# Patient Record
Sex: FEMALE | Race: WHITE | NOT HISPANIC OR LATINO | Employment: OTHER | RURAL
[De-identification: names, ages, dates, MRNs, and addresses within clinical notes are randomized per-mention and may not be internally consistent; named-entity substitution may affect disease eponyms.]

---

## 2020-04-16 ENCOUNTER — HISTORICAL (OUTPATIENT)
Dept: ADMINISTRATIVE | Facility: HOSPITAL | Age: 55
End: 2020-04-16

## 2020-04-16 LAB
ALBUMIN SERPL BCP-MCNC: 3 G/DL (ref 3.5–5)
ALBUMIN/GLOB SERPL: 0.8 {RATIO}
ALP SERPL-CCNC: 38 U/L (ref 41–108)
ALT SERPL W P-5'-P-CCNC: 24 U/L (ref 13–56)
AMPHET UR QL SCN: NEGATIVE NG/ML
APAP SERPL-MCNC: <2 UG/ML (ref 10–30)
AST SERPL W P-5'-P-CCNC: 24 U/L (ref 15–37)
BARBITURATES UR QL SCN: NEGATIVE NG/ML
BASOPHILS # BLD AUTO: 0.05 X10E3/UL (ref 0–0.2)
BASOPHILS NFR BLD AUTO: 0.3 % (ref 0–1)
BENZODIAZ METAB UR QL SCN: NEGATIVE NG/ML
BILIRUB SERPL-MCNC: 0.6 MG/DL (ref 0–1.2)
BILIRUB UR QL STRIP: NEGATIVE MG/DL
BUN SERPL-MCNC: 23 MG/DL (ref 7–18)
BUN/CREAT SERPL: 15.5
CALCIUM SERPL-MCNC: 8.9 MG/DL (ref 8.5–10.1)
CANNABINOIDS UR QL SCN: NEGATIVE NG/ML
CHLORIDE SERPL-SCNC: 99 MMOL/L (ref 98–107)
CLARITY UR: CLEAR
CO2 SERPL-SCNC: 28 MMOL/L (ref 21–32)
COCAINE UR QL SCN: NEGATIVE NG/ML
COLOR UR: ABNORMAL
CREAT SERPL-MCNC: 1.48 MG/DL (ref 0.55–1.02)
EOSINOPHIL # BLD AUTO: 0.06 X10E3/UL (ref 0–0.5)
EOSINOPHIL NFR BLD AUTO: 0.3 % (ref 1–4)
ERYTHROCYTE [DISTWIDTH] IN BLOOD BY AUTOMATED COUNT: 15.8 % (ref 11.5–14.5)
ETHANOL SERPL-MCNC: NORMAL MG/DL (ref 0–10)
GLOBULIN SER-MCNC: 3.8 G/DL (ref 2–4)
GLUCOSE SERPL-MCNC: 93 MG/DL (ref 74–106)
GLUCOSE UR STRIP-MCNC: NEGATIVE MG/DL
HCT VFR BLD AUTO: 34.3 % (ref 38–47)
HGB BLD-MCNC: 11 G/DL (ref 12–16)
IMM GRANULOCYTES # BLD AUTO: 0.21 X10E3/UL (ref 0–0.04)
IMM GRANULOCYTES NFR BLD: 1.1 % (ref 0–0.4)
KETONES UR STRIP-SCNC: NEGATIVE MG/DL
LACTATE SERPL-SCNC: 2.6 MMOL/L (ref 0.4–2)
LEUKOCYTE ESTERASE UR QL STRIP: NEGATIVE LEU/UL
LYMPHOCYTES # BLD AUTO: 2.51 X10E3/UL (ref 1–4.8)
LYMPHOCYTES NFR BLD AUTO: 13 % (ref 27–41)
MCH RBC QN AUTO: 28.6 PG (ref 27–31)
MCHC RBC AUTO-ENTMCNC: 32.1 G/DL (ref 32–36)
MCV RBC AUTO: 89.1 FL (ref 80–96)
MONOCYTES # BLD AUTO: 1.26 X10E3/UL (ref 0–0.8)
MONOCYTES NFR BLD AUTO: 6.5 % (ref 2–6)
MPC BLD CALC-MCNC: 8.7 FL (ref 9.4–12.4)
NEUTROPHILS # BLD AUTO: 15.28 X10E3/UL (ref 1.8–7.7)
NEUTROPHILS NFR BLD AUTO: 78.8 % (ref 53–65)
NITRITE UR QL STRIP: NEGATIVE
NRBC # BLD AUTO: 0 X10E3/UL (ref 0–0)
NRBC, AUTO (.00): 0 /100 (ref 0–0)
OPIATES UR QL SCN: POSITIVE NG/ML
PCP UR QL SCN: NEGATIVE NG/ML
PH UR STRIP: 6.5 PH UNITS (ref 5–8)
PLATELET # BLD AUTO: 341 X10E3/UL (ref 150–400)
POTASSIUM SERPL-SCNC: 3.1 MMOL/L (ref 3.5–5.1)
PROT SERPL-MCNC: 6.8 G/DL (ref 6.4–8.2)
PROT UR QL STRIP: NEGATIVE MG/DL
RBC # BLD AUTO: 3.85 X10E6/UL (ref 4.2–5.4)
RBC # UR STRIP: ABNORMAL ERY/UL
SALICYLATES SERPL-MCNC: 2.3 MG/DL (ref 3–30)
SODIUM SERPL-SCNC: 137 MMOL/L (ref 136–145)
SP GR UR STRIP: 1.01 (ref 1–1.03)
UROBILINOGEN UR STRIP-ACNC: 0.2 EU/DL
WBC # BLD AUTO: 19.37 X10E3/UL (ref 4.5–11)

## 2020-04-17 ENCOUNTER — HISTORICAL (OUTPATIENT)
Dept: ADMINISTRATIVE | Facility: HOSPITAL | Age: 55
End: 2020-04-17

## 2020-04-17 LAB
BASOPHILS # BLD AUTO: 0.03 X10E3/UL (ref 0–0.2)
BASOPHILS NFR BLD AUTO: 0.2 % (ref 0–1)
BUN SERPL-MCNC: 12 MG/DL (ref 7–18)
CALCIUM SERPL-MCNC: 9 MG/DL (ref 8.5–10.1)
CHLORIDE SERPL-SCNC: 102 MMOL/L (ref 98–107)
CO2 SERPL-SCNC: 27 MMOL/L (ref 21–32)
CREAT SERPL-MCNC: 0.83 MG/DL (ref 0.55–1.02)
EOSINOPHIL # BLD AUTO: 0.02 X10E3/UL (ref 0–0.5)
EOSINOPHIL NFR BLD AUTO: 0.1 % (ref 1–4)
ERYTHROCYTE [DISTWIDTH] IN BLOOD BY AUTOMATED COUNT: 15.4 % (ref 11.5–14.5)
GLUCOSE SERPL-MCNC: 104 MG/DL (ref 74–106)
HCT VFR BLD AUTO: 36 % (ref 38–47)
HGB BLD-MCNC: 11.7 G/DL (ref 12–16)
IMM GRANULOCYTES # BLD AUTO: 0.18 X10E3/UL (ref 0–0.04)
IMM GRANULOCYTES NFR BLD: 1 % (ref 0–0.4)
LYMPHOCYTES # BLD AUTO: 1.37 X10E3/UL (ref 1–4.8)
LYMPHOCYTES NFR BLD AUTO: 7.7 % (ref 27–41)
MCH RBC QN AUTO: 28.9 PG (ref 27–31)
MCHC RBC AUTO-ENTMCNC: 32.5 G/DL (ref 32–36)
MCV RBC AUTO: 88.9 FL (ref 80–96)
MONOCYTES # BLD AUTO: 0.59 X10E3/UL (ref 0–0.8)
MONOCYTES NFR BLD AUTO: 3.3 % (ref 2–6)
MPC BLD CALC-MCNC: 8.6 FL (ref 9.4–12.4)
NEUTROPHILS # BLD AUTO: 15.64 X10E3/UL (ref 1.8–7.7)
NEUTROPHILS NFR BLD AUTO: 87.7 % (ref 53–65)
NRBC # BLD AUTO: 0 X10E3/UL (ref 0–0)
NRBC, AUTO (.00): 0 /100 (ref 0–0)
PLATELET # BLD AUTO: 316 X10E3/UL (ref 150–400)
POTASSIUM SERPL-SCNC: 3.9 MMOL/L (ref 3.5–5.1)
RBC # BLD AUTO: 4.05 X10E6/UL (ref 4.2–5.4)
SODIUM SERPL-SCNC: 137 MMOL/L (ref 136–145)
WBC # BLD AUTO: 17.83 X10E3/UL (ref 4.5–11)

## 2020-04-18 ENCOUNTER — HISTORICAL (OUTPATIENT)
Dept: ADMINISTRATIVE | Facility: HOSPITAL | Age: 55
End: 2020-04-18

## 2020-04-18 LAB
BASOPHILS # BLD AUTO: 0.05 X10E3/UL (ref 0–0.2)
BASOPHILS NFR BLD AUTO: 0.3 % (ref 0–1)
BUN SERPL-MCNC: 6 MG/DL (ref 7–18)
CALCIUM SERPL-MCNC: 9.1 MG/DL (ref 8.5–10.1)
CHLORIDE SERPL-SCNC: 97 MMOL/L (ref 98–107)
CO2 SERPL-SCNC: 27 MMOL/L (ref 21–32)
CREAT SERPL-MCNC: 0.82 MG/DL (ref 0.55–1.02)
EOSINOPHIL # BLD AUTO: 0.13 X10E3/UL (ref 0–0.5)
EOSINOPHIL NFR BLD AUTO: 0.9 % (ref 1–4)
ERYTHROCYTE [DISTWIDTH] IN BLOOD BY AUTOMATED COUNT: 15.4 % (ref 11.5–14.5)
GLUCOSE SERPL-MCNC: 67 MG/DL (ref 74–106)
HCT VFR BLD AUTO: 33.4 % (ref 38–47)
HGB BLD-MCNC: 10.7 G/DL (ref 12–16)
IMM GRANULOCYTES # BLD AUTO: 0.11 X10E3/UL (ref 0–0.04)
IMM GRANULOCYTES NFR BLD: 0.7 % (ref 0–0.4)
LYMPHOCYTES # BLD AUTO: 2.38 X10E3/UL (ref 1–4.8)
LYMPHOCYTES NFR BLD AUTO: 15.7 % (ref 27–41)
MCH RBC QN AUTO: 28.6 PG (ref 27–31)
MCHC RBC AUTO-ENTMCNC: 32 G/DL (ref 32–36)
MCV RBC AUTO: 89.3 FL (ref 80–96)
MONOCYTES # BLD AUTO: 0.66 X10E3/UL (ref 0–0.8)
MONOCYTES NFR BLD AUTO: 4.4 % (ref 2–6)
MPC BLD CALC-MCNC: 9 FL (ref 9.4–12.4)
NEUTROPHILS # BLD AUTO: 11.8 X10E3/UL (ref 1.8–7.7)
NEUTROPHILS NFR BLD AUTO: 78 % (ref 53–65)
NRBC # BLD AUTO: 0 X10E3/UL (ref 0–0)
NRBC, AUTO (.00): 0 /100 (ref 0–0)
PLATELET # BLD AUTO: 337 X10E3/UL (ref 150–400)
POTASSIUM SERPL-SCNC: 3.1 MMOL/L (ref 3.5–5.1)
RBC # BLD AUTO: 3.74 X10E6/UL (ref 4.2–5.4)
SODIUM SERPL-SCNC: 135 MMOL/L (ref 136–145)
WBC # BLD AUTO: 15.13 X10E3/UL (ref 4.5–11)

## 2020-04-22 LAB
REPORT: NORMAL

## 2020-07-06 ENCOUNTER — HISTORICAL (OUTPATIENT)
Dept: ADMINISTRATIVE | Facility: HOSPITAL | Age: 55
End: 2020-07-06

## 2020-07-30 ENCOUNTER — HISTORICAL (OUTPATIENT)
Dept: ADMINISTRATIVE | Facility: HOSPITAL | Age: 55
End: 2020-07-30

## 2020-09-16 ENCOUNTER — HISTORICAL (OUTPATIENT)
Dept: ADMINISTRATIVE | Facility: HOSPITAL | Age: 55
End: 2020-09-16

## 2020-12-01 ENCOUNTER — HISTORICAL (OUTPATIENT)
Dept: ADMINISTRATIVE | Facility: HOSPITAL | Age: 55
End: 2020-12-01

## 2020-12-16 ENCOUNTER — HISTORICAL (OUTPATIENT)
Dept: ADMINISTRATIVE | Facility: HOSPITAL | Age: 55
End: 2020-12-16

## 2021-02-02 ENCOUNTER — HISTORICAL (OUTPATIENT)
Dept: ADMINISTRATIVE | Facility: HOSPITAL | Age: 56
End: 2021-02-02

## 2021-03-31 ENCOUNTER — TELEPHONE (OUTPATIENT)
Dept: FAMILY MEDICINE | Facility: CLINIC | Age: 56
End: 2021-03-31

## 2021-04-05 ENCOUNTER — HISTORICAL (OUTPATIENT)
Dept: ADMINISTRATIVE | Facility: HOSPITAL | Age: 56
End: 2021-04-05

## 2021-04-07 ENCOUNTER — HOSPITAL ENCOUNTER (OUTPATIENT)
Dept: RADIOLOGY | Facility: HOSPITAL | Age: 56
Discharge: HOME | End: 2021-04-07
Attending: NURSE PRACTITIONER
Payer: COMMERCIAL

## 2021-04-07 DIAGNOSIS — S72.002S HIP FRACTURE REQUIRING OPERATIVE REPAIR, LEFT, SEQUELA: ICD-10-CM

## 2021-04-07 PROCEDURE — 73552 XR FEMUR 2 VIEW LEFT: ICD-10-PCS | Mod: 26,LT,, | Performed by: RADIOLOGY

## 2021-04-07 PROCEDURE — 73552 X-RAY EXAM OF FEMUR 2/>: CPT | Mod: TC,LT

## 2021-04-07 PROCEDURE — 73552 X-RAY EXAM OF FEMUR 2/>: CPT | Mod: 26,LT,, | Performed by: RADIOLOGY

## 2021-04-29 ENCOUNTER — HOSPITAL ENCOUNTER (OUTPATIENT)
Dept: RADIOLOGY | Facility: HOSPITAL | Age: 56
Discharge: HOME | End: 2021-04-29
Attending: NURSE PRACTITIONER
Payer: COMMERCIAL

## 2021-04-29 DIAGNOSIS — Z47.89 ORTHOPEDIC AFTERCARE: ICD-10-CM

## 2021-04-29 PROBLEM — B58.01 TOXOPLASMA CHORIORETINITIS: Status: ACTIVE | Noted: 2019-08-27

## 2021-04-29 PROBLEM — Z86.19 HISTORY OF THRUSH: Status: ACTIVE | Noted: 2018-06-19

## 2021-04-29 PROBLEM — D80.1 HYPOGAMMAGLOBULINEMIA: Status: ACTIVE | Noted: 2018-06-19

## 2021-04-29 PROBLEM — H35.372 EPIRETINAL MEMBRANE, LEFT EYE: Status: ACTIVE | Noted: 2020-10-27

## 2021-04-29 PROBLEM — Z79.52 CURRENT CHRONIC USE OF SYSTEMIC STEROIDS: Status: ACTIVE | Noted: 2018-06-19

## 2021-04-29 PROBLEM — S72.90XA FEMUR FRACTURE: Status: ACTIVE | Noted: 2021-04-29

## 2021-04-29 PROBLEM — B99.9 RECURRENT INFECTIONS: Status: ACTIVE | Noted: 2018-06-19

## 2021-04-29 PROCEDURE — 73552 X-RAY EXAM OF FEMUR 2/>: CPT | Mod: 26,LT,,

## 2021-04-29 PROCEDURE — 73552 X-RAY EXAM OF FEMUR 2/>: CPT | Mod: TC,LT

## 2021-04-29 PROCEDURE — 73552 XR FEMUR 2 VIEW LEFT: ICD-10-PCS | Mod: 26,LT,,

## 2021-05-24 ENCOUNTER — HOSPITAL ENCOUNTER (OUTPATIENT)
Dept: RADIOLOGY | Facility: HOSPITAL | Age: 56
Discharge: HOME | End: 2021-05-24
Attending: ORTHOPAEDIC SURGERY
Payer: MEDICARE

## 2021-05-24 DIAGNOSIS — Z47.89 ORTHOPEDIC AFTERCARE: ICD-10-CM

## 2021-05-24 PROBLEM — M17.0 PRIMARY OSTEOARTHRITIS OF BOTH KNEES: Status: ACTIVE | Noted: 2021-05-24

## 2021-05-24 PROCEDURE — 73562 X-RAY EXAM OF KNEE 3: CPT | Mod: TC,50

## 2021-05-24 PROCEDURE — 73552 X-RAY EXAM OF FEMUR 2/>: CPT | Mod: TC,LT

## 2021-11-10 ENCOUNTER — OFFICE VISIT (OUTPATIENT)
Dept: FAMILY MEDICINE | Facility: CLINIC | Age: 56
End: 2021-11-10
Payer: COMMERCIAL

## 2021-11-10 VITALS
OXYGEN SATURATION: 97 % | TEMPERATURE: 99 F | BODY MASS INDEX: 43.61 KG/M2 | WEIGHT: 237 LBS | DIASTOLIC BLOOD PRESSURE: 81 MMHG | SYSTOLIC BLOOD PRESSURE: 122 MMHG | RESPIRATION RATE: 20 BRPM | HEART RATE: 127 BPM | HEIGHT: 62 IN

## 2021-11-10 DIAGNOSIS — Z79.899 LONG TERM CURRENT USE OF DIURETIC: ICD-10-CM

## 2021-11-10 DIAGNOSIS — E87.1 HYPONATREMIA: ICD-10-CM

## 2021-11-10 DIAGNOSIS — Z09 HOSPITAL DISCHARGE FOLLOW-UP: Primary | ICD-10-CM

## 2021-11-10 DIAGNOSIS — J18.9 PNEUMONIA OF BOTH LOWER LOBES DUE TO INFECTIOUS ORGANISM: ICD-10-CM

## 2021-11-10 PROCEDURE — 99213 PR OFFICE/OUTPT VISIT, EST, LEVL III, 20-29 MIN: ICD-10-PCS | Mod: ,,, | Performed by: NURSE PRACTITIONER

## 2021-11-10 PROCEDURE — 80053 COMPREHENSIVE METABOLIC PANEL: ICD-10-PCS | Mod: ,,, | Performed by: CLINICAL MEDICAL LABORATORY

## 2021-11-10 PROCEDURE — 80053 COMPREHEN METABOLIC PANEL: CPT | Mod: ,,, | Performed by: CLINICAL MEDICAL LABORATORY

## 2021-11-10 PROCEDURE — 83735 MAGNESIUM: ICD-10-PCS | Mod: ,,, | Performed by: CLINICAL MEDICAL LABORATORY

## 2021-11-10 PROCEDURE — 83735 ASSAY OF MAGNESIUM: CPT | Mod: ,,, | Performed by: CLINICAL MEDICAL LABORATORY

## 2021-11-10 PROCEDURE — 99213 OFFICE O/P EST LOW 20 MIN: CPT | Mod: ,,, | Performed by: NURSE PRACTITIONER

## 2021-11-10 RX ORDER — HYDROXYCHLOROQUINE SULFATE 200 MG/1
200 TABLET, FILM COATED ORAL DAILY
COMMUNITY
Start: 2021-10-21 | End: 2022-10-06

## 2021-11-10 RX ORDER — GABAPENTIN 300 MG/1
300 CAPSULE ORAL 3 TIMES DAILY
COMMUNITY
Start: 2021-10-21 | End: 2021-11-30 | Stop reason: SDUPTHER

## 2021-11-10 RX ORDER — FLUCONAZOLE 200 MG/1
200 TABLET ORAL DAILY
COMMUNITY
Start: 2021-10-21 | End: 2023-01-17 | Stop reason: HOSPADM

## 2021-11-10 RX ORDER — APIXABAN 5 MG/1
10 TABLET, FILM COATED ORAL 2 TIMES DAILY
COMMUNITY
Start: 2021-10-21 | End: 2022-07-22 | Stop reason: SDUPTHER

## 2021-11-10 RX ORDER — CYANOCOBALAMIN 1000 UG/ML
INJECTION, SOLUTION INTRAMUSCULAR; SUBCUTANEOUS
COMMUNITY
Start: 2021-10-21

## 2021-11-10 RX ORDER — PANTOPRAZOLE SODIUM 40 MG/1
40 TABLET, DELAYED RELEASE ORAL DAILY
COMMUNITY
Start: 2021-10-21

## 2021-11-10 RX ORDER — PREDNISONE 5 MG/1
15 TABLET ORAL DAILY
COMMUNITY
Start: 2021-10-21 | End: 2023-05-05

## 2021-11-10 RX ORDER — OXYCODONE AND ACETAMINOPHEN 10; 325 MG/1; MG/1
TABLET ORAL
COMMUNITY
Start: 2021-10-22

## 2021-11-10 RX ORDER — PREDNISONE 10 MG/1
TABLET ORAL
COMMUNITY
Start: 2021-10-21

## 2021-11-10 RX ORDER — BACLOFEN 10 MG/1
10 TABLET ORAL 3 TIMES DAILY PRN
COMMUNITY
Start: 2021-10-21 | End: 2022-07-05

## 2021-11-10 RX ORDER — METFORMIN HYDROCHLORIDE 500 MG/1
1000 TABLET, EXTENDED RELEASE ORAL DAILY
COMMUNITY
Start: 2021-10-21 | End: 2022-07-05

## 2021-11-10 RX ORDER — CYCLOSPORINE 0.5 MG/ML
1 EMULSION OPHTHALMIC
COMMUNITY
Start: 2020-11-12 | End: 2022-12-28

## 2021-11-10 RX ORDER — FOLIC ACID 1 MG/1
1000 TABLET ORAL DAILY
COMMUNITY
Start: 2021-10-21

## 2021-11-10 RX ORDER — HUMAN IMMUNOGLOBULIN G 0.2 G/ML
LIQUID SUBCUTANEOUS
COMMUNITY
Start: 2021-10-19

## 2021-11-10 RX ORDER — SULFAMETHOXAZOLE AND TRIMETHOPRIM 800; 160 MG/1; MG/1
1 TABLET ORAL DAILY
COMMUNITY
Start: 2021-10-21 | End: 2022-10-19

## 2021-11-10 RX ORDER — PROMETHAZINE HYDROCHLORIDE 25 MG/1
TABLET ORAL
COMMUNITY
Start: 2021-10-21

## 2021-11-10 RX ORDER — OXYCODONE HYDROCHLORIDE 20 MG/1
TABLET, FILM COATED, EXTENDED RELEASE ORAL
COMMUNITY
Start: 2021-10-29

## 2021-11-10 RX ORDER — FLUTICASONE PROPIONATE 50 MCG
2 SPRAY, SUSPENSION (ML) NASAL
COMMUNITY
End: 2022-07-05

## 2021-11-10 RX ORDER — THYROID, PORCINE 30 MG/1
30 TABLET ORAL DAILY
COMMUNITY
Start: 2021-10-21

## 2021-11-10 RX ORDER — TIZANIDINE 4 MG/1
4 TABLET ORAL 3 TIMES DAILY PRN
COMMUNITY
Start: 2021-05-20 | End: 2022-07-05

## 2021-11-10 RX ORDER — LISINOPRIL AND HYDROCHLOROTHIAZIDE 20; 25 MG/1; MG/1
1 TABLET ORAL DAILY
COMMUNITY
Start: 2021-10-21 | End: 2021-11-30 | Stop reason: HOSPADM

## 2021-11-12 LAB
ALBUMIN SERPL BCP-MCNC: 3.5 G/DL (ref 3.5–5)
ALBUMIN/GLOB SERPL: 0.8 {RATIO}
ALP SERPL-CCNC: 43 U/L (ref 46–118)
ALT SERPL W P-5'-P-CCNC: 27 U/L (ref 13–56)
ANION GAP SERPL CALCULATED.3IONS-SCNC: 13 MMOL/L (ref 7–16)
AST SERPL W P-5'-P-CCNC: 24 U/L (ref 15–37)
BILIRUB SERPL-MCNC: 0.1 MG/DL (ref 0–1.2)
BUN SERPL-MCNC: 13 MG/DL (ref 7–18)
BUN/CREAT SERPL: 13 (ref 6–20)
CALCIUM SERPL-MCNC: 9.5 MG/DL (ref 8.5–10.1)
CHLORIDE SERPL-SCNC: 97 MMOL/L (ref 98–107)
CO2 SERPL-SCNC: 28 MMOL/L (ref 21–32)
CREAT SERPL-MCNC: 0.97 MG/DL (ref 0.55–1.02)
GLOBULIN SER-MCNC: 4.3 G/DL (ref 2–4)
GLUCOSE SERPL-MCNC: 117 MG/DL (ref 74–106)
MAGNESIUM SERPL-MCNC: 2.2 MG/DL (ref 1.7–2.3)
POTASSIUM SERPL-SCNC: 7.5 MMOL/L (ref 3.5–5.1)
PROT SERPL-MCNC: 7.8 G/DL (ref 6.4–8.2)
SODIUM SERPL-SCNC: 130 MMOL/L (ref 136–145)

## 2021-11-15 ENCOUNTER — TELEPHONE (OUTPATIENT)
Dept: FAMILY MEDICINE | Facility: CLINIC | Age: 56
End: 2021-11-15
Payer: COMMERCIAL

## 2021-11-30 ENCOUNTER — OFFICE VISIT (OUTPATIENT)
Dept: FAMILY MEDICINE | Facility: CLINIC | Age: 56
End: 2021-11-30
Payer: COMMERCIAL

## 2021-11-30 VITALS
WEIGHT: 240.81 LBS | SYSTOLIC BLOOD PRESSURE: 122 MMHG | BODY MASS INDEX: 44.31 KG/M2 | HEART RATE: 112 BPM | TEMPERATURE: 98 F | OXYGEN SATURATION: 98 % | HEIGHT: 62 IN | DIASTOLIC BLOOD PRESSURE: 71 MMHG | RESPIRATION RATE: 18 BRPM

## 2021-11-30 DIAGNOSIS — B02.9 HERPES ZOSTER WITHOUT COMPLICATION: ICD-10-CM

## 2021-11-30 DIAGNOSIS — I10 PRIMARY HYPERTENSION: ICD-10-CM

## 2021-11-30 DIAGNOSIS — J18.9 PNEUMONIA DUE TO INFECTIOUS ORGANISM, UNSPECIFIED LATERALITY, UNSPECIFIED PART OF LUNG: ICD-10-CM

## 2021-11-30 DIAGNOSIS — E87.6 HYPOKALEMIA: ICD-10-CM

## 2021-11-30 DIAGNOSIS — Z09 HOSPITAL DISCHARGE FOLLOW-UP: Primary | ICD-10-CM

## 2021-11-30 DIAGNOSIS — E87.1 HYPONATREMIA: ICD-10-CM

## 2021-11-30 PROCEDURE — 99214 OFFICE O/P EST MOD 30 MIN: CPT | Mod: ,,, | Performed by: NURSE PRACTITIONER

## 2021-11-30 PROCEDURE — 99214 PR OFFICE/OUTPT VISIT, EST, LEVL IV, 30-39 MIN: ICD-10-PCS | Mod: ,,, | Performed by: NURSE PRACTITIONER

## 2021-11-30 RX ORDER — GABAPENTIN 300 MG/1
600 CAPSULE ORAL 3 TIMES DAILY
Qty: 84 CAPSULE | Refills: 0 | Status: SHIPPED | OUTPATIENT
Start: 2021-11-30 | End: 2022-07-05 | Stop reason: DRUGHIGH

## 2021-11-30 RX ORDER — VALACYCLOVIR HYDROCHLORIDE 500 MG/1
500 TABLET, FILM COATED ORAL DAILY
COMMUNITY

## 2021-11-30 RX ORDER — LISINOPRIL 20 MG/1
20 TABLET ORAL DAILY
Qty: 90 TABLET | Refills: 1 | Status: SHIPPED | OUTPATIENT
Start: 2021-11-30 | End: 2022-12-28

## 2022-04-20 ENCOUNTER — HOSPITAL ENCOUNTER (OUTPATIENT)
Dept: RADIOLOGY | Facility: HOSPITAL | Age: 57
Discharge: HOME | End: 2022-04-20
Attending: ORTHOPAEDIC SURGERY
Payer: COMMERCIAL

## 2022-04-20 DIAGNOSIS — M25.512 BILATERAL SHOULDER PAIN, UNSPECIFIED CHRONICITY: ICD-10-CM

## 2022-04-20 DIAGNOSIS — M25.511 BILATERAL SHOULDER PAIN, UNSPECIFIED CHRONICITY: ICD-10-CM

## 2022-04-20 PROCEDURE — 73030 X-RAY EXAM OF SHOULDER: CPT | Mod: TC,50

## 2022-05-09 PROBLEM — M12.811 ROTATOR CUFF ARTHROPATHY OF BOTH SHOULDERS: Status: ACTIVE | Noted: 2022-05-09

## 2022-05-09 PROBLEM — M12.812 ROTATOR CUFF ARTHROPATHY OF BOTH SHOULDERS: Status: ACTIVE | Noted: 2022-05-09

## 2022-06-09 ENCOUNTER — HOSPITAL ENCOUNTER (OUTPATIENT)
Dept: RADIOLOGY | Facility: HOSPITAL | Age: 57
Discharge: HOME | End: 2022-06-09
Payer: COMMERCIAL

## 2022-06-09 ENCOUNTER — HOSPITAL ENCOUNTER (OUTPATIENT)
Dept: RADIOLOGY | Facility: HOSPITAL | Age: 57
Discharge: HOME | End: 2022-06-09
Attending: NURSE PRACTITIONER
Payer: COMMERCIAL

## 2022-06-09 VITALS — WEIGHT: 240 LBS | HEIGHT: 62 IN | BODY MASS INDEX: 44.16 KG/M2

## 2022-06-09 DIAGNOSIS — Z12.31 VISIT FOR SCREENING MAMMOGRAM: ICD-10-CM

## 2022-06-09 DIAGNOSIS — Z47.89 ORTHOPEDIC AFTERCARE: ICD-10-CM

## 2022-06-09 PROBLEM — M12.819 ROTATOR CUFF ARTHROPATHY: Status: ACTIVE | Noted: 2022-06-09

## 2022-06-09 PROCEDURE — 77067 SCR MAMMO BI INCL CAD: CPT | Mod: TC

## 2022-06-09 PROCEDURE — 73552 XR FEMUR 2 VIEW LEFT: ICD-10-PCS | Mod: 26,LT,, | Performed by: RADIOLOGY

## 2022-06-09 PROCEDURE — 73552 X-RAY EXAM OF FEMUR 2/>: CPT | Mod: TC,LT

## 2022-06-09 PROCEDURE — 73552 X-RAY EXAM OF FEMUR 2/>: CPT | Mod: 26,LT,, | Performed by: RADIOLOGY

## 2022-07-05 ENCOUNTER — OFFICE VISIT (OUTPATIENT)
Dept: FAMILY MEDICINE | Facility: CLINIC | Age: 57
End: 2022-07-05
Payer: MEDICARE

## 2022-07-05 VITALS
RESPIRATION RATE: 20 BRPM | TEMPERATURE: 99 F | DIASTOLIC BLOOD PRESSURE: 104 MMHG | WEIGHT: 245 LBS | HEIGHT: 62 IN | HEART RATE: 95 BPM | SYSTOLIC BLOOD PRESSURE: 162 MMHG | OXYGEN SATURATION: 98 % | BODY MASS INDEX: 45.08 KG/M2

## 2022-07-05 DIAGNOSIS — D83.9 COMMON VARIABLE IMMUNODEFICIENCY: ICD-10-CM

## 2022-07-05 DIAGNOSIS — Z86.711 HISTORY OF PULMONARY EMBOLISM: ICD-10-CM

## 2022-07-05 DIAGNOSIS — Z79.01 ANTICOAGULANT LONG-TERM USE: ICD-10-CM

## 2022-07-05 DIAGNOSIS — I10 PRIMARY HYPERTENSION: ICD-10-CM

## 2022-07-05 DIAGNOSIS — I26.99 PULMONARY EMBOLISM, UNSPECIFIED CHRONICITY, UNSPECIFIED PULMONARY EMBOLISM TYPE, UNSPECIFIED WHETHER ACUTE COR PULMONALE PRESENT: ICD-10-CM

## 2022-07-05 DIAGNOSIS — E66.01 MORBID OBESITY: ICD-10-CM

## 2022-07-05 DIAGNOSIS — Z09 HOSPITAL DISCHARGE FOLLOW-UP: Primary | ICD-10-CM

## 2022-07-05 PROCEDURE — 99212 OFFICE O/P EST SF 10 MIN: CPT | Mod: ,,, | Performed by: NURSE PRACTITIONER

## 2022-07-05 PROCEDURE — 99212 PR OFFICE/OUTPT VISIT, EST, LEVL II, 10-19 MIN: ICD-10-PCS | Mod: ,,, | Performed by: NURSE PRACTITIONER

## 2022-07-05 RX ORDER — SEMAGLUTIDE 1.34 MG/ML
1 INJECTION, SOLUTION SUBCUTANEOUS
COMMUNITY
Start: 2022-06-08 | End: 2022-12-28 | Stop reason: DRUGHIGH

## 2022-07-05 RX ORDER — CYCLOBENZAPRINE HCL 10 MG
10 TABLET ORAL 2 TIMES DAILY PRN
COMMUNITY
Start: 2022-06-06

## 2022-07-05 RX ORDER — EZETIMIBE 10 MG/1
10 TABLET ORAL DAILY
COMMUNITY
Start: 2022-06-22

## 2022-07-05 RX ORDER — FENOFIBRATE 160 MG/1
160 TABLET ORAL DAILY
COMMUNITY
Start: 2022-06-22

## 2022-07-05 RX ORDER — GABAPENTIN 600 MG/1
600 TABLET ORAL 3 TIMES DAILY
COMMUNITY
Start: 2022-06-13

## 2022-07-05 NOTE — PROGRESS NOTES
Subjective:       Patient ID: Hawa Street is a 57 y.o. female.    Chief Complaint: Hospital Follow Up (CHANEL Agudelo)    Ms. Street presents to clinic in hospital follow up, she did not bring her medications and discharge medications were reconciled verbally with patient. Hospital records reviewed, she was no longer taking anticoagulant and developed pulmonary embolus per CT of chest - she confirms that she is currently taking Eliquis 10mg BID and will go back to 5mg BID as instructed. She reports dyspnea on exertion, discussed that this should improve slowly. She is morbidly obese with sedentary lifestyle and multiple co morbidities. She denies bleeding, multiple bruises noted to bilateral lower legs and bilateral forearms.    Review of Systems   Constitutional: Positive for fatigue. Negative for activity change, appetite change and unexpected weight change.   Respiratory: Positive for shortness of breath. Negative for cough and wheezing.    Cardiovascular: Negative for chest pain, palpitations and leg swelling.   Gastrointestinal: Negative for abdominal pain and blood in stool.   Genitourinary: Negative.    Musculoskeletal: Positive for back pain.   Neurological: Negative.    Psychiatric/Behavioral: Negative for dysphoric mood. The patient is not nervous/anxious.          Objective:      Physical Exam  Vitals and nursing note reviewed.   Constitutional:       Appearance: Normal appearance. She is obese.   HENT:      Head: Normocephalic.   Cardiovascular:      Rate and Rhythm: Normal rate and regular rhythm.      Pulses: Normal pulses.      Heart sounds: Normal heart sounds.   Pulmonary:      Effort: Pulmonary effort is normal.      Breath sounds: Normal breath sounds.   Abdominal:      General: Bowel sounds are normal.      Palpations: Abdomen is soft.   Musculoskeletal:         General: Normal range of motion.      Cervical back: Normal range of motion.   Skin:     General: Skin is warm and dry.    Neurological:      Mental Status: She is alert and oriented to person, place, and time.   Psychiatric:         Behavior: Behavior normal.         Assessment:       Problem List Items Addressed This Visit        Cardiac/Vascular    Hypertension       Immunology/Multi System    Common variable immunodeficiency       Hematology    History of pulmonary embolism      Other Visit Diagnoses     Hospital discharge follow-up    -  Primary    Pulmonary embolism, unspecified chronicity, unspecified pulmonary embolism type, unspecified whether acute cor pulmonale present        Anticoagulant long-term use        Morbid obesity              Plan:        Hospital records reviewed, continue eliquis as prescribed. Discussed with the patient that she should stay on anticoagulants indefinitely with recurrence of pulmonary embolus.   Encouraged increased activity as tolerated. Schedule follow up in 2 months.

## 2022-07-22 RX ORDER — APIXABAN 5 MG/1
10 TABLET, FILM COATED ORAL 2 TIMES DAILY
Qty: 90 TABLET | Refills: 1 | Status: SHIPPED | OUTPATIENT
Start: 2022-07-22 | End: 2022-10-06

## 2022-10-06 ENCOUNTER — OFFICE VISIT (OUTPATIENT)
Dept: FAMILY MEDICINE | Facility: CLINIC | Age: 57
End: 2022-10-06
Payer: COMMERCIAL

## 2022-10-06 VITALS
RESPIRATION RATE: 18 BRPM | BODY MASS INDEX: 46.01 KG/M2 | HEIGHT: 62 IN | TEMPERATURE: 98 F | OXYGEN SATURATION: 95 % | WEIGHT: 250 LBS | HEART RATE: 101 BPM

## 2022-10-06 DIAGNOSIS — Z86.711 HISTORY OF PULMONARY EMBOLISM: ICD-10-CM

## 2022-10-06 DIAGNOSIS — D80.1 HYPOGAMMAGLOBULINEMIA: ICD-10-CM

## 2022-10-06 DIAGNOSIS — J02.9 SORE THROAT: ICD-10-CM

## 2022-10-06 DIAGNOSIS — M12.811 ROTATOR CUFF ARTHROPATHY OF BOTH SHOULDERS: ICD-10-CM

## 2022-10-06 DIAGNOSIS — J40 BRONCHITIS: Primary | ICD-10-CM

## 2022-10-06 DIAGNOSIS — M35.00 SICCA SYNDROME: ICD-10-CM

## 2022-10-06 DIAGNOSIS — E66.01 MORBID OBESITY: ICD-10-CM

## 2022-10-06 DIAGNOSIS — Z79.01 ANTICOAGULANT LONG-TERM USE: ICD-10-CM

## 2022-10-06 DIAGNOSIS — E03.9 HYPOTHYROIDISM, UNSPECIFIED TYPE: ICD-10-CM

## 2022-10-06 DIAGNOSIS — M12.812 ROTATOR CUFF ARTHROPATHY OF BOTH SHOULDERS: ICD-10-CM

## 2022-10-06 DIAGNOSIS — I10 PRIMARY HYPERTENSION: ICD-10-CM

## 2022-10-06 DIAGNOSIS — B58.01 TOXOPLASMA CHORIORETINITIS: ICD-10-CM

## 2022-10-06 DIAGNOSIS — D83.9 COMMON VARIABLE IMMUNODEFICIENCY: ICD-10-CM

## 2022-10-06 DIAGNOSIS — M06.9 RHEUMATOID ARTHRITIS INVOLVING MULTIPLE SITES, UNSPECIFIED WHETHER RHEUMATOID FACTOR PRESENT: ICD-10-CM

## 2022-10-06 DIAGNOSIS — Z01.818 ENCOUNTER FOR PREOPERATIVE EXAMINATION FOR GENERAL SURGICAL PROCEDURE: ICD-10-CM

## 2022-10-06 PROCEDURE — 87804 PR  DETECT AGENT,IMMUN,DIR OBS,INFLUENZA: ICD-10-PCS | Mod: QW,59,91, | Performed by: NURSE PRACTITIONER

## 2022-10-06 PROCEDURE — 87426 SARSCOV CORONAVIRUS AG IA: CPT | Mod: QW,,, | Performed by: NURSE PRACTITIONER

## 2022-10-06 PROCEDURE — 99213 OFFICE O/P EST LOW 20 MIN: CPT | Mod: ,,, | Performed by: NURSE PRACTITIONER

## 2022-10-06 PROCEDURE — 99213 PR OFFICE/OUTPT VISIT, EST, LEVL III, 20-29 MIN: ICD-10-PCS | Mod: ,,, | Performed by: NURSE PRACTITIONER

## 2022-10-06 PROCEDURE — 87804 INFLUENZA ASSAY W/OPTIC: CPT | Mod: QW,,, | Performed by: NURSE PRACTITIONER

## 2022-10-06 PROCEDURE — 87426 SARSCOV CORONAVIRUS AG IA: CPT | Mod: RHCUB | Performed by: NURSE PRACTITIONER

## 2022-10-06 PROCEDURE — 87426 PR SARS-COV-2 COVID-19 IMMUNOASSAY QUAL/SEMIQUANT, MULT STEP METHOD: ICD-10-PCS | Mod: QW,,, | Performed by: NURSE PRACTITIONER

## 2022-10-06 PROCEDURE — 87804 INFLUENZA ASSAY W/OPTIC: CPT | Mod: RHCUB | Performed by: NURSE PRACTITIONER

## 2022-10-06 RX ORDER — ALBUTEROL SULFATE 90 UG/1
2 AEROSOL, METERED RESPIRATORY (INHALATION) EVERY 6 HOURS PRN
Qty: 18 G | Refills: 1 | Status: SHIPPED | OUTPATIENT
Start: 2022-10-06 | End: 2022-11-22

## 2022-10-06 RX ORDER — APIXABAN 5 MG/1
5 TABLET, FILM COATED ORAL 2 TIMES DAILY
Qty: 180 TABLET | Refills: 1 | Status: SHIPPED | OUTPATIENT
Start: 2022-10-06

## 2022-10-06 RX ORDER — ALBUTEROL SULFATE 0.63 MG/3ML
0.63 SOLUTION RESPIRATORY (INHALATION) EVERY 6 HOURS PRN
Qty: 100 ML | Refills: 0 | Status: SHIPPED | OUTPATIENT
Start: 2022-10-06 | End: 2023-10-06

## 2022-10-06 RX ORDER — AZITHROMYCIN 250 MG/1
TABLET, FILM COATED ORAL
Qty: 6 TABLET | Refills: 0 | Status: SHIPPED | OUTPATIENT
Start: 2022-10-06 | End: 2022-10-11

## 2022-10-06 NOTE — PROGRESS NOTES
Subjective:       Patient ID: Hawa Street is a 57 y.o. female.    Chief Complaint: Sore Throat (Started yesterday) and Cough (Dry and productive- yellow/green in color. Head congestion. )    Ms. Street presents to clinic with complaints of sore throat, chest congestion, productive cough, and wheezing. She reports symptom onset one day ago. She was originally scheduled for surgical clearance, but felt sick and this will need to be postponed. She is having shoulder surgery in [place] later this month. She continues to gain weight, she is sedentary and reports that since her recent PE she continues to have significant shortness of breath on exertion. She reports taking eliquis as prescribed.     She denies fever, chills, N/V/D. Flu and covid tests were negative today. The patient was seen in the parking lot.    Review of Systems   Constitutional:  Positive for activity change.   HENT:  Positive for sore throat. Negative for nasal congestion, postnasal drip and sinus pressure/congestion.    Respiratory:  Positive for cough, shortness of breath and wheezing.    Cardiovascular: Negative.    Gastrointestinal: Negative.        Objective:      Physical Exam  Vitals and nursing note reviewed.   Constitutional:       General: She is not in acute distress.     Appearance: Normal appearance. She is obese. She is not ill-appearing.   HENT:      Head: Normocephalic.      Right Ear: Tympanic membrane normal.      Left Ear: Tympanic membrane normal.      Nose: Nose normal.      Mouth/Throat:      Mouth: Mucous membranes are dry.      Pharynx: Posterior oropharyngeal erythema present.   Eyes:      Pupils: Pupils are equal, round, and reactive to light.   Cardiovascular:      Rate and Rhythm: Normal rate and regular rhythm.      Heart sounds: Normal heart sounds.   Pulmonary:      Effort: Pulmonary effort is normal. No respiratory distress.      Breath sounds: Wheezing present.   Musculoskeletal:      Cervical back: Normal  range of motion.   Lymphadenopathy:      Cervical: Cervical adenopathy present.   Neurological:      Mental Status: She is alert and oriented to person, place, and time.   Psychiatric:         Behavior: Behavior normal.       Assessment:       Problem List Items Addressed This Visit          Ophtho    Toxoplasma chorioretinitis       Cardiac/Vascular    Hypertension       Immunology/Multi System    Hypogammaglobulinemia    Common variable immunodeficiency    Rheumatoid arthritis    Sicca syndrome       Hematology    History of pulmonary embolism       Endocrine    Hypothyroidism       Orthopedic    Rotator cuff arthropathy of both shoulders     Other Visit Diagnoses       Bronchitis    -  Primary    Sore throat        Relevant Orders    POCT Influenza A/B    SARS Coronavirus 2 Antigen, POCT    Encounter for preoperative examination for general surgical procedure        Relevant Orders    Ambulatory referral/consult to Internal Medicine    Anticoagulant long-term use        Morbid obesity                  Plan:        Refer to IM for surgical clearance due to her multiple co morbidities.    Flu/Covid negative, take zpack as prescribed, proair inhaler vs. Nebs as this may work better if she is unable to take a deep breath due to dyspnea and her size.

## 2022-10-12 ENCOUNTER — TELEPHONE (OUTPATIENT)
Dept: FAMILY MEDICINE | Facility: CLINIC | Age: 57
End: 2022-10-12
Payer: COMMERCIAL

## 2022-10-12 LAB
CTP QC/QA: YES
CTP QC/QA: YES
FLUAV AG NPH QL: NEGATIVE
FLUBV AG NPH QL: NEGATIVE
SARS-COV-2 AG RESP QL IA.RAPID: NEGATIVE

## 2022-10-12 NOTE — TELEPHONE ENCOUNTER
----- Message from Ela Ignacio sent at 10/10/2022  2:17 PM CDT -----  Regarding: Needs a call back  Contact: Patient  Pt needs: a call back about recent visit, treatment. Coughing still. Had visit Thursday last week.    Pharmacy: [facility] - [place], [place] - [address] [place] [address]  Phone: [phone] Fax: [phone]        Phone #:  [phone]

## 2022-10-12 NOTE — TELEPHONE ENCOUNTER
----- Message from Ela Ignacio sent at 10/10/2022  2:17 PM CDT -----  Regarding: Needs a call back  Contact: Patient  Pt needs: a call back about recent visit, treatment. Coughing still. Had visit Thursday last week.    Pharmacy: [facility] - [place], [place] - [address] [place] [address]  Phone: [phone] Fax: [phone]        Phone #:  [phone]

## 2022-10-19 ENCOUNTER — OFFICE VISIT (OUTPATIENT)
Dept: INTERNAL MEDICINE | Facility: CLINIC | Age: 57
End: 2022-10-19
Payer: COMMERCIAL

## 2022-10-19 VITALS
SYSTOLIC BLOOD PRESSURE: 128 MMHG | HEIGHT: 62 IN | RESPIRATION RATE: 22 BRPM | HEART RATE: 106 BPM | OXYGEN SATURATION: 98 % | BODY MASS INDEX: 44.53 KG/M2 | WEIGHT: 242 LBS | DIASTOLIC BLOOD PRESSURE: 64 MMHG

## 2022-10-19 DIAGNOSIS — E55.9 VITAMIN D DEFICIENCY: ICD-10-CM

## 2022-10-19 DIAGNOSIS — Z86.711 HISTORY OF PULMONARY EMBOLISM: ICD-10-CM

## 2022-10-19 DIAGNOSIS — E66.01 CLASS 3 SEVERE OBESITY WITH BODY MASS INDEX (BMI) OF 40.0 TO 44.9 IN ADULT, UNSPECIFIED OBESITY TYPE, UNSPECIFIED WHETHER SERIOUS COMORBIDITY PRESENT: ICD-10-CM

## 2022-10-19 DIAGNOSIS — K21.9 GASTROESOPHAGEAL REFLUX DISEASE, UNSPECIFIED WHETHER ESOPHAGITIS PRESENT: ICD-10-CM

## 2022-10-19 DIAGNOSIS — Z01.818 ENCOUNTER FOR PREOPERATIVE EXAMINATION FOR GENERAL SURGICAL PROCEDURE: ICD-10-CM

## 2022-10-19 DIAGNOSIS — M12.819 ROTATOR CUFF ARTHROPATHY, UNSPECIFIED LATERALITY: ICD-10-CM

## 2022-10-19 DIAGNOSIS — R07.9 CHEST PAIN, UNSPECIFIED TYPE: ICD-10-CM

## 2022-10-19 DIAGNOSIS — Z79.52 CURRENT CHRONIC USE OF SYSTEMIC STEROIDS: ICD-10-CM

## 2022-10-19 DIAGNOSIS — D83.9 COMMON VARIABLE IMMUNODEFICIENCY: ICD-10-CM

## 2022-10-19 DIAGNOSIS — F41.9 ANXIETY: ICD-10-CM

## 2022-10-19 DIAGNOSIS — E78.5 DYSLIPIDEMIA: ICD-10-CM

## 2022-10-19 DIAGNOSIS — I10 PRIMARY HYPERTENSION: ICD-10-CM

## 2022-10-19 DIAGNOSIS — M06.9 RHEUMATOID ARTHRITIS INVOLVING MULTIPLE SITES, UNSPECIFIED WHETHER RHEUMATOID FACTOR PRESENT: ICD-10-CM

## 2022-10-19 DIAGNOSIS — E03.9 HYPOTHYROIDISM, UNSPECIFIED TYPE: Primary | ICD-10-CM

## 2022-10-19 DIAGNOSIS — M17.0 PRIMARY OSTEOARTHRITIS OF BOTH KNEES: ICD-10-CM

## 2022-10-19 DIAGNOSIS — R06.09 DYSPNEA ON EXERTION: ICD-10-CM

## 2022-10-19 DIAGNOSIS — D64.9 NORMOCYTIC ANEMIA: ICD-10-CM

## 2022-10-19 PROBLEM — E66.813 CLASS 3 SEVERE OBESITY WITH BODY MASS INDEX (BMI) OF 40.0 TO 44.9 IN ADULT: Status: ACTIVE | Noted: 2022-10-19

## 2022-10-19 PROCEDURE — 4010F PR ACE/ARB THEARPY RXD/TAKEN: ICD-10-PCS | Mod: ,,, | Performed by: INTERNAL MEDICINE

## 2022-10-19 PROCEDURE — 3074F PR MOST RECENT SYSTOLIC BLOOD PRESSURE < 130 MM HG: ICD-10-PCS | Mod: ,,, | Performed by: INTERNAL MEDICINE

## 2022-10-19 PROCEDURE — 3078F DIAST BP <80 MM HG: CPT | Mod: ,,, | Performed by: INTERNAL MEDICINE

## 2022-10-19 PROCEDURE — 4010F ACE/ARB THERAPY RXD/TAKEN: CPT | Mod: ,,, | Performed by: INTERNAL MEDICINE

## 2022-10-19 PROCEDURE — 3078F PR MOST RECENT DIASTOLIC BLOOD PRESSURE < 80 MM HG: ICD-10-PCS | Mod: ,,, | Performed by: INTERNAL MEDICINE

## 2022-10-19 PROCEDURE — 3074F SYST BP LT 130 MM HG: CPT | Mod: ,,, | Performed by: INTERNAL MEDICINE

## 2022-10-19 PROCEDURE — 99205 OFFICE O/P NEW HI 60 MIN: CPT | Mod: S$PBB,,, | Performed by: INTERNAL MEDICINE

## 2022-10-19 PROCEDURE — 99205 PR OFFICE/OUTPT VISIT, NEW, LEVL V, 60-74 MIN: ICD-10-PCS | Mod: S$PBB,,, | Performed by: INTERNAL MEDICINE

## 2022-10-19 PROCEDURE — 99215 OFFICE O/P EST HI 40 MIN: CPT | Mod: PBBFAC | Performed by: INTERNAL MEDICINE

## 2022-10-19 PROCEDURE — 1159F PR MEDICATION LIST DOCUMENTED IN MEDICAL RECORD: ICD-10-PCS | Mod: ,,, | Performed by: INTERNAL MEDICINE

## 2022-10-19 PROCEDURE — 1159F MED LIST DOCD IN RCRD: CPT | Mod: ,,, | Performed by: INTERNAL MEDICINE

## 2022-10-19 RX ORDER — CALCIUM CITRATE/VITAMIN D3 200MG-6.25
TABLET ORAL
COMMUNITY
Start: 2022-06-02

## 2022-10-19 RX ORDER — LISINOPRIL AND HYDROCHLOROTHIAZIDE 20; 25 MG/1; MG/1
TABLET ORAL
COMMUNITY
Start: 2022-10-12 | End: 2023-05-05 | Stop reason: HOSPADM

## 2022-10-19 NOTE — PROGRESS NOTES
"Subjective:       Patient ID: Hawa Street is a 57 y.o. female.    Chief Complaint: Pre-op Exam (Left shoulder surgery by Dr. Spring @ [facility] in [place]. Surgery scheduled 10/26/2022)    The patient is a 56 yo female that presents today for surgical pre-op examination. She has a complex past medical history. This includes hypothyroidism, PTE on two separate occasions, hypertension, dyslipidemia, DM II, Rheumatoid arthritis, Sjogrens, CVID, GERD, chronic steroid use. Plans are for left shoulder surgery next week. No known history of CAD, CVD, CHF, or valvular heart disease. She denies chest pain at rest. She does have some tightness and DESHPANDE with even minimal exertion. She states that she has never had a "cardiac" workup in the past. Father with history of CAD with bypass surgery. She has undergone multiple surgeries in the past without any complications or issues with anesthesia. She uses rollator or wheel chair for ambulation. Today she is resting comfortably in no distress. She is afebrile and vital signs are stable.    Review of Systems   Constitutional:  Positive for fatigue. Negative for appetite change, chills and fever.   HENT:  Positive for nasal congestion and sinus pressure/congestion. Negative for ear pain, hearing loss and sore throat.    Eyes:  Positive for eye dryness. Negative for pain, redness and visual disturbance.   Respiratory:  Positive for chest tightness and shortness of breath. Negative for apnea, cough and wheezing.    Cardiovascular:  Negative for chest pain and palpitations.   Gastrointestinal:  Negative for abdominal pain, constipation, diarrhea and nausea.   Endocrine: Negative for cold intolerance, heat intolerance and polyuria.   Genitourinary:  Negative for dysuria and hematuria.   Musculoskeletal:  Positive for arthralgias, back pain, neck pain and neck stiffness. Negative for joint swelling and myalgias.   Integumentary:  Negative for pallor, rash and wound. "   Allergic/Immunologic: Negative for immunocompromised state.   Neurological:  Negative for tremors, seizures, weakness, headaches and memory loss.   Hematological:  Negative for adenopathy.   Psychiatric/Behavioral:  Negative for confusion, dysphoric mood and sleep disturbance. The patient is not nervous/anxious.        Objective:      Physical Exam  Vitals and nursing note reviewed.   Constitutional:       General: She is not in acute distress.     Appearance: Normal appearance. She is obese. She is not ill-appearing.   HENT:      Head: Normocephalic and atraumatic.      Right Ear: External ear normal.      Left Ear: External ear normal.      Nose: Nose normal.      Mouth/Throat:      Pharynx: Oropharynx is clear.   Eyes:      Extraocular Movements: Extraocular movements intact.      Conjunctiva/sclera: Conjunctivae normal.      Pupils: Pupils are equal, round, and reactive to light.   Neck:      Vascular: No carotid bruit.   Cardiovascular:      Rate and Rhythm: Regular rhythm. Tachycardia present.      Pulses: Normal pulses.      Heart sounds: Normal heart sounds. No murmur heard.     Comments: Heart tones distant  Pulmonary:      Effort: No respiratory distress.      Breath sounds: Normal breath sounds. No wheezing or rales.   Abdominal:      General: Bowel sounds are normal.      Palpations: Abdomen is soft.   Musculoskeletal:         General: Normal range of motion.      Cervical back: Normal range of motion and neck supple.      Right lower leg: No edema.      Left lower leg: No edema.   Skin:     General: Skin is warm and dry.      Capillary Refill: Capillary refill takes less than 2 seconds.      Coloration: Skin is not pale.   Neurological:      General: No focal deficit present.      Mental Status: She is alert and oriented to person, place, and time.      Cranial Nerves: No cranial nerve deficit.      Gait: Gait abnormal.   Psychiatric:         Mood and Affect: Mood normal.         Judgment: Judgment  normal.       Assessment:       Problem List Items Addressed This Visit          Psychiatric    Anxiety       Cardiac/Vascular    Hypertension    Relevant Orders    Comprehensive Metabolic Panel    Dyslipidemia    Dyspnea on exertion    Relevant Orders    Echo       Immunology/Multi System    Common variable immunodeficiency    Rheumatoid arthritis       Hematology    History of pulmonary embolism       Oncology    Normocytic anemia    Relevant Orders    CBC Auto Differential       Endocrine    Current chronic use of systemic steroids    Vitamin D deficiency    Hypothyroidism - Primary       GI    Gastroesophageal reflux disease       Orthopedic    Primary osteoarthritis of both knees    Rotator cuff arthropathy     Other Visit Diagnoses       Encounter for preoperative examination for general surgical procedure        Chest pain, unspecified type        Relevant Orders    NM Myocardial Perfusion Spect Multi Pharmacologic    Nuclear Stress Test    Echo              Plan:     Shoulder osteoarthritis- patient presents today for surgical pre-op. No known history of CAD, CVD, CHF, or valvular heart disease. There is a family history of CAD in her father. She does have several risk factors that increase her risk for CAD. She is not able to perform at > 4 mets. She has significant DESHPANDE with even minimal exertion. This could be anginal equivalent. Due to this we are going to set her up for nuclear stress test and echo.    2.   History of PTE- on two separate occasions. She is on eliquis and this is recommended for life. Some of the DESHPANDE may be related to this. Her second PE developed when     she took herself off of the eliquis before a tooth extraction. She was off of it for a couple of months when she developed the clot. Therefore recommend minimal disruption in the eliquis.    3.   Dm II- She is on ozempic. I do not have any recent labs so we are going to order some to see how her creatinine and blood sugar is.    4.    Autoimmune disorders- RA, Sjogrens. She follows with rheumatology. She is chronically on prednisone. This could make healing difficult    5.   Essential hypertension- BP is good. 128/64. Continue home medications perioperatively    6.   Normocytic anemia- likely related to anemia of chronic disease. No recent CBC so I have ordered one    7.   Dyspnea on exertion- could be related to history of PTE, she does vape and this could contribute, also might represent anginal equivalent. We are going to check echo and nuclear stress test. Counseled on smoking cessation    8.   CVID- she follows with immunology. Not certain how diagnosis was made.

## 2022-10-26 ENCOUNTER — HOSPITAL ENCOUNTER (OUTPATIENT)
Dept: RADIOLOGY | Facility: HOSPITAL | Age: 57
Discharge: HOME | End: 2022-10-26
Attending: INTERNAL MEDICINE
Payer: COMMERCIAL

## 2022-10-26 ENCOUNTER — HOSPITAL ENCOUNTER (OUTPATIENT)
Dept: CARDIOLOGY | Facility: HOSPITAL | Age: 57
Discharge: HOME | End: 2022-10-26
Attending: INTERNAL MEDICINE
Payer: COMMERCIAL

## 2022-10-26 VITALS
HEIGHT: 68 IN | WEIGHT: 242 LBS | SYSTOLIC BLOOD PRESSURE: 114 MMHG | DIASTOLIC BLOOD PRESSURE: 84 MMHG | BODY MASS INDEX: 36.37 KG/M2 | HEART RATE: 88 BPM | BODY MASS INDEX: 44.53 KG/M2 | HEIGHT: 62 IN | WEIGHT: 240 LBS

## 2022-10-26 DIAGNOSIS — R07.9 CHEST PAIN, UNSPECIFIED TYPE: ICD-10-CM

## 2022-10-26 DIAGNOSIS — R06.09 DYSPNEA ON EXERTION: ICD-10-CM

## 2022-10-26 LAB
CV STRESS BASE HR: 88 BPM
DIASTOLIC BLOOD PRESSURE: 84 MMHG
OHS CV CPX 85 PERCENT MAX PREDICTED HEART RATE MALE: 133
OHS CV CPX MAX PREDICTED HEART RATE: 157
OHS CV CPX PATIENT IS FEMALE: 1
OHS CV CPX PATIENT IS MALE: 0
OHS CV CPX PEAK DIASTOLIC BLOOD PRESSURE: 98 MMHG
OHS CV CPX PEAK HEAR RATE: 110 BPM
OHS CV CPX PEAK RATE PRESSURE PRODUCT: NORMAL
OHS CV CPX PEAK SYSTOLIC BLOOD PRESSURE: 138 MMHG
OHS CV CPX PERCENT MAX PREDICTED HEART RATE ACHIEVED: 70
OHS CV CPX RATE PRESSURE PRODUCT PRESENTING: NORMAL
SYSTOLIC BLOOD PRESSURE: 114 MMHG

## 2022-10-26 PROCEDURE — A9500 TC99M SESTAMIBI: HCPCS

## 2022-10-26 PROCEDURE — 78452 HT MUSCLE IMAGE SPECT MULT: CPT | Mod: 26,,, | Performed by: STUDENT IN AN ORGANIZED HEALTH CARE EDUCATION/TRAINING PROGRAM

## 2022-10-26 PROCEDURE — 93306 TTE W/DOPPLER COMPLETE: CPT

## 2022-10-26 PROCEDURE — 93018 NUCLEAR STRESS TEST (CUPID ONLY): ICD-10-PCS | Mod: ,,, | Performed by: STUDENT IN AN ORGANIZED HEALTH CARE EDUCATION/TRAINING PROGRAM

## 2022-10-26 PROCEDURE — 63600175 PHARM REV CODE 636 W HCPCS: Performed by: INTERNAL MEDICINE

## 2022-10-26 PROCEDURE — 93017 CV STRESS TEST TRACING ONLY: CPT

## 2022-10-26 PROCEDURE — 93018 CV STRESS TEST I&R ONLY: CPT | Mod: ,,, | Performed by: STUDENT IN AN ORGANIZED HEALTH CARE EDUCATION/TRAINING PROGRAM

## 2022-10-26 PROCEDURE — 93016 NUCLEAR STRESS TEST (CUPID ONLY): ICD-10-PCS | Mod: ,,, | Performed by: NURSE PRACTITIONER

## 2022-10-26 PROCEDURE — 93016 CV STRESS TEST SUPVJ ONLY: CPT | Mod: ,,, | Performed by: NURSE PRACTITIONER

## 2022-10-26 PROCEDURE — 93306 TTE W/DOPPLER COMPLETE: CPT | Mod: 26,,, | Performed by: STUDENT IN AN ORGANIZED HEALTH CARE EDUCATION/TRAINING PROGRAM

## 2022-10-26 PROCEDURE — 93306 ECHO (CUPID ONLY): ICD-10-PCS | Mod: 26,,, | Performed by: STUDENT IN AN ORGANIZED HEALTH CARE EDUCATION/TRAINING PROGRAM

## 2022-10-26 PROCEDURE — 78452 NM MYOCARDIAL PERFUSION SPECT MULTI PHARM: ICD-10-PCS | Mod: 26,,, | Performed by: STUDENT IN AN ORGANIZED HEALTH CARE EDUCATION/TRAINING PROGRAM

## 2022-10-26 RX ORDER — REGADENOSON 0.08 MG/ML
0.4 INJECTION, SOLUTION INTRAVENOUS ONCE
Status: COMPLETED | OUTPATIENT
Start: 2022-10-26 | End: 2022-10-26

## 2022-10-26 RX ADMIN — REGADENOSON 0.4 MG: 0.08 INJECTION, SOLUTION INTRAVENOUS at 10:10

## 2022-10-28 LAB
AORTIC VALVE CUSP SEPERATION: 1.94 CM
AV INDEX (PROSTH): 0.76
AV MEAN GRADIENT: 5 MMHG
AV PEAK GRADIENT: 9 MMHG
AV VALVE AREA: 1.74 CM2
BSA FOR ECHO PROCEDURE: 2.19 M2
CV ECHO LV RWT: 0.63 CM
DOP CALC AO PEAK VEL: 1.5 M/S
DOP CALC AO VTI: 24.4 CM
DOP CALC LVOT AREA: 2.3 CM2
DOP CALC LVOT DIAMETER: 1.71 CM
DOP CALC LVOT STROKE VOLUME: 42.56 CM3
DOP CALCLVOT PEAK VEL VTI: 18.54 CM
E WAVE DECELERATION TIME: 246 MSEC
E/A RATIO: 0.92
E/E' RATIO: 9.2 M/S
ECHO LV POSTERIOR WALL: 1.15 CM (ref 0.6–1.1)
EJECTION FRACTION: 55 %
FRACTIONAL SHORTENING: 28 % (ref 28–44)
INTERVENTRICULAR SEPTUM: 1.11 CM (ref 0.6–1.1)
IVC DIAMETER: 1.5 CM
LEFT ATRIUM SIZE: 2.83 CM
LEFT INTERNAL DIMENSION IN SYSTOLE: 2.61 CM (ref 2.1–4)
LEFT VENTRICLE DIASTOLIC VOLUME INDEX: 27 ML/M2
LEFT VENTRICLE DIASTOLIC VOLUME: 55.89 ML
LEFT VENTRICLE MASS INDEX: 63 G/M2
LEFT VENTRICLE SYSTOLIC VOLUME INDEX: 12 ML/M2
LEFT VENTRICLE SYSTOLIC VOLUME: 24.84 ML
LEFT VENTRICULAR INTERNAL DIMENSION IN DIASTOLE: 3.64 CM (ref 3.5–6)
LEFT VENTRICULAR MASS: 131.35 G
LV LATERAL E/E' RATIO: 9.86 M/S
LV SEPTAL E/E' RATIO: 8.63 M/S
MV PEAK A VEL: 0.75 M/S
MV PEAK E VEL: 0.69 M/S
PISA TR MAX VEL: 1.71 M/S
RA PRESSURE: 3 MMHG
TDI LATERAL: 0.07 M/S
TDI SEPTAL: 0.08 M/S
TDI: 0.08 M/S
TR MAX PG: 12 MMHG
TV REST PULMONARY ARTERY PRESSURE: 15 MMHG

## 2022-11-11 ENCOUNTER — OFFICE VISIT (OUTPATIENT)
Dept: CARDIOLOGY | Facility: CLINIC | Age: 57
End: 2022-11-11
Payer: COMMERCIAL

## 2022-11-11 VITALS
HEART RATE: 114 BPM | HEIGHT: 62 IN | WEIGHT: 257 LBS | BODY MASS INDEX: 47.29 KG/M2 | DIASTOLIC BLOOD PRESSURE: 70 MMHG | RESPIRATION RATE: 18 BRPM | SYSTOLIC BLOOD PRESSURE: 130 MMHG

## 2022-11-11 DIAGNOSIS — I10 PRIMARY HYPERTENSION: Primary | ICD-10-CM

## 2022-11-11 DIAGNOSIS — Z01.810 ENCOUNTER FOR PRE-OPERATIVE CARDIOVASCULAR CLEARANCE: Primary | ICD-10-CM

## 2022-11-11 DIAGNOSIS — R06.09 DYSPNEA ON EXERTION: ICD-10-CM

## 2022-11-11 DIAGNOSIS — I10 PRIMARY HYPERTENSION: ICD-10-CM

## 2022-11-11 PROCEDURE — 1159F MED LIST DOCD IN RCRD: CPT | Mod: ,,, | Performed by: STUDENT IN AN ORGANIZED HEALTH CARE EDUCATION/TRAINING PROGRAM

## 2022-11-11 PROCEDURE — 3008F BODY MASS INDEX DOCD: CPT | Mod: ,,, | Performed by: STUDENT IN AN ORGANIZED HEALTH CARE EDUCATION/TRAINING PROGRAM

## 2022-11-11 PROCEDURE — 99214 OFFICE O/P EST MOD 30 MIN: CPT | Mod: PBBFAC | Performed by: STUDENT IN AN ORGANIZED HEALTH CARE EDUCATION/TRAINING PROGRAM

## 2022-11-11 PROCEDURE — 1159F PR MEDICATION LIST DOCUMENTED IN MEDICAL RECORD: ICD-10-PCS | Mod: ,,, | Performed by: STUDENT IN AN ORGANIZED HEALTH CARE EDUCATION/TRAINING PROGRAM

## 2022-11-11 PROCEDURE — 93005 ELECTROCARDIOGRAM TRACING: CPT | Mod: PBBFAC | Performed by: STUDENT IN AN ORGANIZED HEALTH CARE EDUCATION/TRAINING PROGRAM

## 2022-11-11 PROCEDURE — 93010 EKG 12-LEAD: ICD-10-PCS | Mod: S$PBB,,, | Performed by: STUDENT IN AN ORGANIZED HEALTH CARE EDUCATION/TRAINING PROGRAM

## 2022-11-11 PROCEDURE — 99204 PR OFFICE/OUTPT VISIT, NEW, LEVL IV, 45-59 MIN: ICD-10-PCS | Mod: S$PBB,,, | Performed by: STUDENT IN AN ORGANIZED HEALTH CARE EDUCATION/TRAINING PROGRAM

## 2022-11-11 PROCEDURE — 93010 ELECTROCARDIOGRAM REPORT: CPT | Mod: S$PBB,,, | Performed by: STUDENT IN AN ORGANIZED HEALTH CARE EDUCATION/TRAINING PROGRAM

## 2022-11-11 PROCEDURE — 3008F PR BODY MASS INDEX (BMI) DOCUMENTED: ICD-10-PCS | Mod: ,,, | Performed by: STUDENT IN AN ORGANIZED HEALTH CARE EDUCATION/TRAINING PROGRAM

## 2022-11-11 PROCEDURE — 3078F DIAST BP <80 MM HG: CPT | Mod: ,,, | Performed by: STUDENT IN AN ORGANIZED HEALTH CARE EDUCATION/TRAINING PROGRAM

## 2022-11-11 PROCEDURE — 99204 OFFICE O/P NEW MOD 45 MIN: CPT | Mod: S$PBB,,, | Performed by: STUDENT IN AN ORGANIZED HEALTH CARE EDUCATION/TRAINING PROGRAM

## 2022-11-11 PROCEDURE — 3078F PR MOST RECENT DIASTOLIC BLOOD PRESSURE < 80 MM HG: ICD-10-PCS | Mod: ,,, | Performed by: STUDENT IN AN ORGANIZED HEALTH CARE EDUCATION/TRAINING PROGRAM

## 2022-11-11 PROCEDURE — 4010F PR ACE/ARB THEARPY RXD/TAKEN: ICD-10-PCS | Mod: ,,, | Performed by: STUDENT IN AN ORGANIZED HEALTH CARE EDUCATION/TRAINING PROGRAM

## 2022-11-11 PROCEDURE — 3075F SYST BP GE 130 - 139MM HG: CPT | Mod: ,,, | Performed by: STUDENT IN AN ORGANIZED HEALTH CARE EDUCATION/TRAINING PROGRAM

## 2022-11-11 PROCEDURE — 3075F PR MOST RECENT SYSTOLIC BLOOD PRESS GE 130-139MM HG: ICD-10-PCS | Mod: ,,, | Performed by: STUDENT IN AN ORGANIZED HEALTH CARE EDUCATION/TRAINING PROGRAM

## 2022-11-11 PROCEDURE — 4010F ACE/ARB THERAPY RXD/TAKEN: CPT | Mod: ,,, | Performed by: STUDENT IN AN ORGANIZED HEALTH CARE EDUCATION/TRAINING PROGRAM

## 2022-11-11 NOTE — PROGRESS NOTES
PCP: OMAR Hopper    Referring Provider:     Subjective:   Hawa Street is a 57 y.o. female with hx of DM, Automimmune dz, hx of PE, HTN, obesity  who presents for pre-evaluation.     Patient is schedule for left shoulder surgery.   She underwent ECHO and lexiscan given her limited mobility due to prior injury and is unable to do 4 mets.     Fhx:non-contributory  Shx: Denies smoking, etoh or drug    EKG 11/11/22 - Sinus tachy, VICTORIANO, non-sp ST-T abnl  ECHO 10/16/22 -   The left ventricle is normal in size with normal systolic function.  The estimated ejection fraction is 55%.  Normal left ventricular diastolic function.  Normal right ventricular size with normal right ventricular systolic function.  Normal central venous pressure (3 mmHg).  The estimated PA systolic pressure is 15 mmHg.     Lexiscan 10/16/22  1.  Scintigraphically negative for ischemia or infarct.  2. the global left ventricular systolic function is normal with an LV ejection fraction of 85 % and no evidence of LV dilatation. Wall motion is normal.      Lab Results   Component Value Date     10/19/2022    K 4.2 10/19/2022    CL 98 10/19/2022    CO2 28 10/19/2022    BUN 28 (H) 10/19/2022    CREATININE 1.45 (H) 10/19/2022    CALCIUM 10.4 (H) 10/19/2022    ANIONGAP 14 10/19/2022    ESTGFRAFRICA 93 04/18/2020    EGFRNONAA 63 11/10/2021       No results found for: CHOL  No results found for: HDL  No results found for: LDLCALC  No results found for: TRIG  No results found for: CHOLHDL    Lab Results   Component Value Date    WBC 24.74 (H) 10/19/2022    HGB 12.5 10/19/2022    HCT 39.8 10/19/2022    MCV 93.9 10/19/2022     (H) 10/19/2022           Current Outpatient Medications:     albuterol (ACCUNEB) 0.63 mg/3 mL Nebu, Take 3 mLs (0.63 mg total) by nebulization every 6 (six) hours as needed (coughing, wheezing). Rescue, Disp: 100 mL, Rfl: 0    albuterol (PROAIR HFA) 90 mcg/actuation inhaler, Inhale 2 puffs into the lungs every 6 (six)  hours as needed for Wheezing. Rescue, Disp: 18 g, Rfl: 1    ARMOUR THYROID 30 mg Tab, Take 30 mg by mouth once daily., Disp: , Rfl:     cyanocobalamin 1,000 mcg/mL injection, INJECT ONE ML INTRAMUSCULARLY once weekly, Disp: , Rfl:     cyclobenzaprine (FLEXERIL) 10 MG tablet, Take 10 mg by mouth 2 (two) times daily as needed., Disp: , Rfl:     cycloSPORINE (RESTASIS) 0.05 % ophthalmic emulsion, Apply 1 each to eye., Disp: , Rfl:     ELIQUIS 5 mg Tab, Take 1 tablet (5 mg total) by mouth 2 (two) times daily., Disp: 180 tablet, Rfl: 1    ezetimibe (ZETIA) 10 mg tablet, Take 10 mg by mouth once daily., Disp: , Rfl:     fenofibrate 160 MG Tab, Take 160 mg by mouth once daily., Disp: , Rfl:     fluconazole (DIFLUCAN) 200 MG Tab, Take 200 mg by mouth once daily., Disp: , Rfl:     folic acid (FOLVITE) 1 MG tablet, Take 1,000 mcg by mouth once daily., Disp: , Rfl:     gabapentin (NEURONTIN) 600 MG tablet, Take 600 mg by mouth 3 (three) times daily., Disp: , Rfl:     HIZENTRA 4 gram/20 mL (20 %) Soln, , Disp: , Rfl:     lisinopriL (PRINIVIL,ZESTRIL) 20 MG tablet, Take 1 tablet (20 mg total) by mouth once daily., Disp: 90 tablet, Rfl: 1    lisinopriL-hydrochlorothiazide (PRINZIDE,ZESTORETIC) 20-25 mg Tab, , Disp: , Rfl:     oxyCODONE-acetaminophen (PERCOCET)  mg per tablet, 10/22/21 TAKE ONE TABLET BY MOUTH TWICE DAILY AS NEEDED, Disp: , Rfl:     OXYCONTIN 20 mg 12 hr tablet, 10/29/21 TAKE ONE TABLET BY MOUTH EVERY TWELVE HOURS, Disp: , Rfl:     OZEMPIC 1 mg/dose (4 mg/3 mL), Inject 1 mg into the skin every 7 days., Disp: , Rfl:     pantoprazole (PROTONIX) 40 MG tablet, Take 40 mg by mouth once daily., Disp: , Rfl:     predniSONE (DELTASONE) 10 MG tablet, TAKE ONE TABLET BY MOUTH EVERY DAY (WITH 5mg), Disp: , Rfl:     predniSONE (DELTASONE) 5 MG tablet, Take 15 mg by mouth once daily., Disp: , Rfl:     promethazine (PHENERGAN) 25 MG tablet, TAKE ONE TABLET BY MOUTH EVERY 4 TO 6 HOURS AS NEEDED, Disp: , Rfl:     TRUE  "METRIX GLUCOSE TEST STRIP Strp, check sugar, Disp: , Rfl:     valACYclovir (VALTREX) 500 MG tablet, Take 500 mg by mouth once daily., Disp: , Rfl:     Review of Systems   Respiratory:  Negative for cough and shortness of breath.    Cardiovascular:  Negative for chest pain, palpitations, orthopnea, claudication, leg swelling and PND.       Objective:   /70   Pulse (!) 114   Resp 18   Ht 5' 2" (1.575 m)   Wt 116.6 kg (257 lb)   BMI 47.01 kg/m²     Physical Exam  Vitals and nursing note reviewed.   Constitutional:       Appearance: Normal appearance.   Cardiovascular:      Rate and Rhythm: Normal rate and regular rhythm.      Pulses: Normal pulses.      Heart sounds: Normal heart sounds.   Pulmonary:      Effort: Pulmonary effort is normal.      Breath sounds: Normal breath sounds.   Neurological:      Mental Status: She is alert.         Assessment:     1. Encounter for pre-operative cardiovascular clearance        2. Primary hypertension  EKG 12-lead      3. Dyspnea on exertion              Plan:   Encounter for pre-operative cardiovascular clearance  Less than 4 METs  Normal ECHO and normal Lexiscan  Low risk for surgery    Dyspnea on exertion  Deconditioning  50 Lb wgt gain        "

## 2022-12-28 ENCOUNTER — OFFICE VISIT (OUTPATIENT)
Dept: FAMILY MEDICINE | Facility: CLINIC | Age: 57
End: 2022-12-28
Payer: COMMERCIAL

## 2022-12-28 VITALS
TEMPERATURE: 99 F | OXYGEN SATURATION: 95 % | RESPIRATION RATE: 20 BRPM | HEART RATE: 120 BPM | DIASTOLIC BLOOD PRESSURE: 70 MMHG | WEIGHT: 248 LBS | SYSTOLIC BLOOD PRESSURE: 94 MMHG | HEIGHT: 62 IN | BODY MASS INDEX: 45.64 KG/M2

## 2022-12-28 DIAGNOSIS — R05.3 CHRONIC COUGH: ICD-10-CM

## 2022-12-28 DIAGNOSIS — N30.01 ACUTE CYSTITIS WITH HEMATURIA: Primary | ICD-10-CM

## 2022-12-28 DIAGNOSIS — R06.09 DYSPNEA ON EXERTION: ICD-10-CM

## 2022-12-28 DIAGNOSIS — R93.89 ABNORMAL CHEST X-RAY: ICD-10-CM

## 2022-12-28 DIAGNOSIS — R30.0 DYSURIA: ICD-10-CM

## 2022-12-28 LAB
BILIRUB SERPL-MCNC: NORMAL MG/DL
BLOOD URINE, POC: NORMAL
COLOR, POC UA: NORMAL
GLUCOSE UR QL STRIP: 100
KETONES UR QL STRIP: NORMAL
LEUKOCYTE ESTERASE URINE, POC: NORMAL
NITRITE, POC UA: POSITIVE
PH, POC UA: 5.5
PROTEIN, POC: NORMAL
SPECIFIC GRAVITY, POC UA: 1.02
UROBILINOGEN, POC UA: 1

## 2022-12-28 PROCEDURE — 3078F PR MOST RECENT DIASTOLIC BLOOD PRESSURE < 80 MM HG: ICD-10-PCS | Mod: ,,, | Performed by: NURSE PRACTITIONER

## 2022-12-28 PROCEDURE — 1160F RVW MEDS BY RX/DR IN RCRD: CPT | Mod: ,,, | Performed by: NURSE PRACTITIONER

## 2022-12-28 PROCEDURE — 3074F PR MOST RECENT SYSTOLIC BLOOD PRESSURE < 130 MM HG: ICD-10-PCS | Mod: ,,, | Performed by: NURSE PRACTITIONER

## 2022-12-28 PROCEDURE — 4010F ACE/ARB THERAPY RXD/TAKEN: CPT | Mod: ,,, | Performed by: NURSE PRACTITIONER

## 2022-12-28 PROCEDURE — 81003 POCT URINALYSIS W/O SCOPE: ICD-10-PCS | Mod: QW,,, | Performed by: NURSE PRACTITIONER

## 2022-12-28 PROCEDURE — 3008F PR BODY MASS INDEX (BMI) DOCUMENTED: ICD-10-PCS | Mod: ,,, | Performed by: NURSE PRACTITIONER

## 2022-12-28 PROCEDURE — 1159F PR MEDICATION LIST DOCUMENTED IN MEDICAL RECORD: ICD-10-PCS | Mod: ,,, | Performed by: NURSE PRACTITIONER

## 2022-12-28 PROCEDURE — 87077 CULTURE, URINE: ICD-10-PCS | Mod: ,,, | Performed by: CLINICAL MEDICAL LABORATORY

## 2022-12-28 PROCEDURE — 1160F PR REVIEW ALL MEDS BY PRESCRIBER/CLIN PHARMACIST DOCUMENTED: ICD-10-PCS | Mod: ,,, | Performed by: NURSE PRACTITIONER

## 2022-12-28 PROCEDURE — 87077 CULTURE AEROBIC IDENTIFY: CPT | Mod: ,,, | Performed by: CLINICAL MEDICAL LABORATORY

## 2022-12-28 PROCEDURE — 96372 THER/PROPH/DIAG INJ SC/IM: CPT | Mod: ,,, | Performed by: NURSE PRACTITIONER

## 2022-12-28 PROCEDURE — 87186 CULTURE, URINE: ICD-10-PCS | Mod: ,,, | Performed by: CLINICAL MEDICAL LABORATORY

## 2022-12-28 PROCEDURE — 3008F BODY MASS INDEX DOCD: CPT | Mod: ,,, | Performed by: NURSE PRACTITIONER

## 2022-12-28 PROCEDURE — 96372 PR INJECTION,THERAP/PROPH/DIAG2ST, IM OR SUBCUT: ICD-10-PCS | Mod: ,,, | Performed by: NURSE PRACTITIONER

## 2022-12-28 PROCEDURE — 87186 SC STD MICRODIL/AGAR DIL: CPT | Mod: ,,, | Performed by: CLINICAL MEDICAL LABORATORY

## 2022-12-28 PROCEDURE — 4010F PR ACE/ARB THEARPY RXD/TAKEN: ICD-10-PCS | Mod: ,,, | Performed by: NURSE PRACTITIONER

## 2022-12-28 PROCEDURE — 99214 OFFICE O/P EST MOD 30 MIN: CPT | Mod: 25,,, | Performed by: NURSE PRACTITIONER

## 2022-12-28 PROCEDURE — 99214 PR OFFICE/OUTPT VISIT, EST, LEVL IV, 30-39 MIN: ICD-10-PCS | Mod: 25,,, | Performed by: NURSE PRACTITIONER

## 2022-12-28 PROCEDURE — 81003 URINALYSIS AUTO W/O SCOPE: CPT | Mod: QW,,, | Performed by: NURSE PRACTITIONER

## 2022-12-28 PROCEDURE — 87086 URINE CULTURE/COLONY COUNT: CPT | Mod: ,,, | Performed by: CLINICAL MEDICAL LABORATORY

## 2022-12-28 PROCEDURE — 87086 CULTURE, URINE: ICD-10-PCS | Mod: ,,, | Performed by: CLINICAL MEDICAL LABORATORY

## 2022-12-28 PROCEDURE — 1159F MED LIST DOCD IN RCRD: CPT | Mod: ,,, | Performed by: NURSE PRACTITIONER

## 2022-12-28 PROCEDURE — 3078F DIAST BP <80 MM HG: CPT | Mod: ,,, | Performed by: NURSE PRACTITIONER

## 2022-12-28 PROCEDURE — 3074F SYST BP LT 130 MM HG: CPT | Mod: ,,, | Performed by: NURSE PRACTITIONER

## 2022-12-28 RX ORDER — HUMAN IMMUNOGLOBULIN G 0.2 G/ML
LIQUID SUBCUTANEOUS
COMMUNITY
Start: 2022-11-21

## 2022-12-28 RX ORDER — SULFAMETHOXAZOLE AND TRIMETHOPRIM 800; 160 MG/1; MG/1
1 TABLET ORAL DAILY
COMMUNITY
Start: 2022-12-20

## 2022-12-28 RX ORDER — NITROFURANTOIN 25; 75 MG/1; MG/1
100 CAPSULE ORAL 2 TIMES DAILY
Qty: 14 CAPSULE | Refills: 0 | Status: SHIPPED | OUTPATIENT
Start: 2022-12-28 | End: 2023-01-17 | Stop reason: ALTCHOICE

## 2022-12-28 RX ORDER — SEMAGLUTIDE 2.68 MG/ML
2 INJECTION, SOLUTION SUBCUTANEOUS
COMMUNITY
Start: 2022-12-08 | End: 2023-05-05

## 2022-12-28 RX ORDER — GENTAMICIN SULFATE 40 MG/ML
80 INJECTION, SOLUTION INTRAMUSCULAR; INTRAVENOUS
Status: COMPLETED | OUTPATIENT
Start: 2022-12-28 | End: 2022-12-28

## 2022-12-28 RX ADMIN — GENTAMICIN SULFATE 80 MG: 40 INJECTION, SOLUTION INTRAMUSCULAR; INTRAVENOUS at 04:12

## 2022-12-28 NOTE — PROGRESS NOTES
"Subjective:       Patient ID: Hawa Street is a 57 y.o. female.    Chief Complaint: Dysuria (X 1 week, po bactrim prophylactic use, azo  ) and Nausea (With chills)    Ms. Street presents to clinic with complaints of dysuria and pelvic discomfort onset over a week ago. She reports she has been taking AZO and increased fluids, she notes that this morning she had chills, nausea, general malaise and fatigue, concerned for UTI that is not getting better. She takes Bactrim DS daily for prior eye infection and was told to remain on it indefinitely, states she did not take todays dose.    She reports she hears a "crunching" in the left lower lobe when she takes a deep breath. She has a chronic cough with a h/o pulmonary embolism, currently on anticoagulant. She states it feels as though a bag is being crumpled in the lung. She has not had a CXR in some time.    She complains of dizziness that comes and goes suddenly when she moves quickly in bed, usually when she moves her head.    Review of Systems   Constitutional:  Positive for chills, fatigue and fever.   Respiratory:  Positive for shortness of breath. Negative for wheezing and stridor.    Cardiovascular: Negative.    Gastrointestinal:  Positive for nausea.   Genitourinary:  Positive for dysuria.   Neurological:  Positive for dizziness.       Objective:      Physical Exam  Vitals and nursing note reviewed.   Constitutional:       General: She is not in acute distress.     Appearance: Normal appearance. She is obese. She is not ill-appearing.   HENT:      Head: Normocephalic.   Cardiovascular:      Rate and Rhythm: Regular rhythm. Tachycardia present.      Heart sounds: Normal heart sounds.   Pulmonary:      Effort: Pulmonary effort is normal. No respiratory distress.      Breath sounds: No wheezing or rhonchi.      Comments: Abnormal left lower lobe base with inspiration  Abdominal:      General: Bowel sounds are normal. There is no distension.      Palpations: " Abdomen is soft.      Tenderness: There is no abdominal tenderness. There is no right CVA tenderness or left CVA tenderness.   Skin:     General: Skin is warm and dry.   Neurological:      Mental Status: She is alert and oriented to person, place, and time.   Psychiatric:         Behavior: Behavior normal.       Assessment:       Problem List Items Addressed This Visit          Cardiac/Vascular    Dyspnea on exertion    Relevant Orders    X-Ray Chest PA And Lateral     Other Visit Diagnoses       Acute cystitis with hematuria    -  Primary    Relevant Medications    gentamicin injection 80 mg (Completed)    Other Relevant Orders    Urine culture    Dysuria        Relevant Orders    POCT URINALYSIS W/O SCOPE (Completed)    Chronic cough        Relevant Orders    X-Ray Chest PA And Lateral              Plan:        Abnormal urine dip, culture pending. Pt. Does take bactrim ds daily. Gentamicin 80mg IM today, macrobid bid X 7 days.    [facility] nursing to repeat UA in 2 weeks due to UTI.

## 2022-12-30 LAB — UA COMPLETE W REFLEX CULTURE PNL UR: ABNORMAL

## 2023-01-17 ENCOUNTER — TELEPHONE (OUTPATIENT)
Dept: FAMILY MEDICINE | Facility: CLINIC | Age: 58
End: 2023-01-17
Payer: COMMERCIAL

## 2023-01-17 RX ORDER — CEFUROXIME AXETIL 500 MG/1
500 TABLET ORAL EVERY 12 HOURS
Qty: 20 TABLET | Refills: 0 | Status: SHIPPED | OUTPATIENT
Start: 2023-01-17 | End: 2023-02-06 | Stop reason: ALTCHOICE

## 2023-01-17 RX ORDER — LEVOFLOXACIN 500 MG/1
500 TABLET, FILM COATED ORAL DAILY
Qty: 7 TABLET | Refills: 0 | Status: SHIPPED | OUTPATIENT
Start: 2023-01-17 | End: 2023-01-17 | Stop reason: ALTCHOICE

## 2023-01-17 NOTE — TELEPHONE ENCOUNTER
----- Message from Ela Ignacio sent at 1/13/2023  9:09 AM CST -----  Regarding: Pt needs: a call back about her results.  Contact: Patient  Pt needs: a call back about her results.      Phone #:  [phone]

## 2023-02-04 DIAGNOSIS — R06.09 DYSPNEA ON EXERTION: ICD-10-CM

## 2023-02-04 DIAGNOSIS — R93.89 ABNORMAL CXR: Primary | ICD-10-CM

## 2023-02-06 ENCOUNTER — HOSPITAL ENCOUNTER (OUTPATIENT)
Dept: RADIOLOGY | Facility: HOSPITAL | Age: 58
Discharge: HOME | End: 2023-02-06
Attending: INTERNAL MEDICINE
Payer: COMMERCIAL

## 2023-02-06 ENCOUNTER — OFFICE VISIT (OUTPATIENT)
Dept: PULMONOLOGY | Facility: CLINIC | Age: 58
End: 2023-02-06
Payer: COMMERCIAL

## 2023-02-06 VITALS
BODY MASS INDEX: 46.01 KG/M2 | OXYGEN SATURATION: 94 % | RESPIRATION RATE: 18 BRPM | TEMPERATURE: 98 F | DIASTOLIC BLOOD PRESSURE: 60 MMHG | HEART RATE: 109 BPM | WEIGHT: 250 LBS | HEIGHT: 62 IN | SYSTOLIC BLOOD PRESSURE: 116 MMHG

## 2023-02-06 DIAGNOSIS — R93.89 ABNORMAL CHEST X-RAY: ICD-10-CM

## 2023-02-06 DIAGNOSIS — M06.9 RHEUMATOID ARTHRITIS INVOLVING MULTIPLE SITES, UNSPECIFIED WHETHER RHEUMATOID FACTOR PRESENT: ICD-10-CM

## 2023-02-06 DIAGNOSIS — R06.09 DYSPNEA ON EXERTION: ICD-10-CM

## 2023-02-06 DIAGNOSIS — E66.01 CLASS 3 SEVERE OBESITY WITH BODY MASS INDEX (BMI) OF 40.0 TO 44.9 IN ADULT, UNSPECIFIED OBESITY TYPE, UNSPECIFIED WHETHER SERIOUS COMORBIDITY PRESENT: ICD-10-CM

## 2023-02-06 DIAGNOSIS — R93.89 ABNORMAL CXR: ICD-10-CM

## 2023-02-06 DIAGNOSIS — R06.02 SOB (SHORTNESS OF BREATH): ICD-10-CM

## 2023-02-06 DIAGNOSIS — R07.9 CHEST PAIN, UNSPECIFIED TYPE: Primary | ICD-10-CM

## 2023-02-06 PROCEDURE — 3078F PR MOST RECENT DIASTOLIC BLOOD PRESSURE < 80 MM HG: ICD-10-PCS | Mod: ,,, | Performed by: INTERNAL MEDICINE

## 2023-02-06 PROCEDURE — 3074F PR MOST RECENT SYSTOLIC BLOOD PRESSURE < 130 MM HG: ICD-10-PCS | Mod: ,,, | Performed by: INTERNAL MEDICINE

## 2023-02-06 PROCEDURE — 1159F MED LIST DOCD IN RCRD: CPT | Mod: ,,, | Performed by: INTERNAL MEDICINE

## 2023-02-06 PROCEDURE — 71046 X-RAY EXAM CHEST 2 VIEWS: CPT | Mod: TC

## 2023-02-06 PROCEDURE — 99215 OFFICE O/P EST HI 40 MIN: CPT | Mod: PBBFAC,25 | Performed by: INTERNAL MEDICINE

## 2023-02-06 PROCEDURE — 3008F BODY MASS INDEX DOCD: CPT | Mod: ,,, | Performed by: INTERNAL MEDICINE

## 2023-02-06 PROCEDURE — 99205 PR OFFICE/OUTPT VISIT, NEW, LEVL V, 60-74 MIN: ICD-10-PCS | Mod: S$PBB,,, | Performed by: INTERNAL MEDICINE

## 2023-02-06 PROCEDURE — 3074F SYST BP LT 130 MM HG: CPT | Mod: ,,, | Performed by: INTERNAL MEDICINE

## 2023-02-06 PROCEDURE — 4010F PR ACE/ARB THEARPY RXD/TAKEN: ICD-10-PCS | Mod: ,,, | Performed by: INTERNAL MEDICINE

## 2023-02-06 PROCEDURE — 71046 XR CHEST PA AND LATERAL: ICD-10-PCS | Mod: 26,,, | Performed by: RADIOLOGY

## 2023-02-06 PROCEDURE — 99205 OFFICE O/P NEW HI 60 MIN: CPT | Mod: S$PBB,,, | Performed by: INTERNAL MEDICINE

## 2023-02-06 PROCEDURE — 71046 X-RAY EXAM CHEST 2 VIEWS: CPT | Mod: 26,,, | Performed by: RADIOLOGY

## 2023-02-06 PROCEDURE — 3008F PR BODY MASS INDEX (BMI) DOCUMENTED: ICD-10-PCS | Mod: ,,, | Performed by: INTERNAL MEDICINE

## 2023-02-06 PROCEDURE — 1159F PR MEDICATION LIST DOCUMENTED IN MEDICAL RECORD: ICD-10-PCS | Mod: ,,, | Performed by: INTERNAL MEDICINE

## 2023-02-06 PROCEDURE — 4010F ACE/ARB THERAPY RXD/TAKEN: CPT | Mod: ,,, | Performed by: INTERNAL MEDICINE

## 2023-02-06 PROCEDURE — 3078F DIAST BP <80 MM HG: CPT | Mod: ,,, | Performed by: INTERNAL MEDICINE

## 2023-02-06 RX ORDER — BUDESONIDE AND FORMOTEROL FUMARATE DIHYDRATE 160; 4.5 UG/1; UG/1
2 AEROSOL RESPIRATORY (INHALATION) EVERY 12 HOURS
Qty: 10.2 G | Refills: 5 | Status: SHIPPED | OUTPATIENT
Start: 2023-02-06 | End: 2023-03-08

## 2023-02-07 DIAGNOSIS — Z86.711 HISTORY OF PULMONARY EMBOLISM: Primary | ICD-10-CM

## 2023-02-07 DIAGNOSIS — R06.09 DYSPNEA ON EXERTION: ICD-10-CM

## 2023-02-07 NOTE — PROGRESS NOTES
Subjective:       Patient ID: Hawa Street is a 57 y.o. female.    Chief Complaint: Abnormal Chest X-ray    Abnormal Chest X-ray  This is a chronic problem. The current episode started today. The problem has been unchanged. Pertinent negatives include no abdominal pain, arthralgias, chest pain, chills, congestion, headaches or rash.   Past Medical History:   Diagnosis Date    Anxiety     Common variable immunodeficiency     Hashimoto's disease     History of osteopenia     History of pulmonary embolism     Hypertension     Hypothyroidism     Kidney stone     Myalgia     Nontoxic multinodular goiter     Other long term (current) drug therapy     Rheumatoid arthritis     Sicca syndrome     Toxoplasmosis     Vitamin D deficiency      Past Surgical History:   Procedure Laterality Date     SECTION      ESOPHAGOGASTRODUODENOSCOPY      HIP SURGERY      HYSTERECTOMY      LAPAROSCOPIC GASTRIC BANDING      SHOULDER SURGERY Right     TONSILLECTOMY      TUBAL LIGATION       Family History   Problem Relation Age of Onset    Cancer Mother     Cancer Father     Prostate cancer Father     Heart disease Father      Review of patient's allergies indicates:   Allergen Reactions    Nsaids (non-steroidal anti-inflammatory drug)     Statins-hmg-coa reductase inhibitors       Social History     Tobacco Use    Smoking status: Every Day     Types: Vaping with nicotine    Smokeless tobacco: Never    Tobacco comments:     Former cigarette smoking 1/2- 1 PPD ~ 30 Yrs: quit ~  (1 yr ago): occasional Vaping now   Substance Use Topics    Alcohol use: Not Currently    Drug use: Never      Review of Systems   Constitutional:  Negative for chills, activity change and night sweats.   HENT:  Negative for congestion and ear pain.    Eyes:  Negative for redness and itching.   Cardiovascular:  Negative for chest pain and palpitations.   Musculoskeletal:  Negative for arthralgias and back pain.   Skin:  Negative for rash.    Gastrointestinal:  Negative for abdominal pain and abdominal distention.   Neurological:  Negative for dizziness and headaches.   Hematological:  Negative for adenopathy. Does not bruise/bleed easily.   Psychiatric/Behavioral:  Negative for confusion. The patient is not nervous/anxious.      Objective:      Physical Exam   Constitutional: She is oriented to person, place, and time. She appears well-developed and well-nourished.   HENT:   Head: Normocephalic.   Nose: Nose normal.   Mouth/Throat: Oropharynx is clear and moist.   Neck: No JVD present. No thyromegaly present.   Cardiovascular: Normal rate, regular rhythm, normal heart sounds and intact distal pulses.   Pulmonary/Chest: Normal expansion, hyperinflation, symmetric chest wall expansion, effort normal and breath sounds normal.   Abdominal: Soft. Bowel sounds are normal.   Musculoskeletal:         General: Normal range of motion.      Cervical back: Normal range of motion and neck supple.   Lymphadenopathy: No supraclavicular adenopathy is present.     She has no cervical adenopathy.   Neurological: She is alert and oriented to person, place, and time. She has normal reflexes.   Skin: Skin is warm and dry.   Psychiatric: She has a normal mood and affect. Her behavior is normal.   Personal Diagnostic Review  none pertinent    No flowsheet data found.      Assessment:       1. Chest pain, unspecified type    2. Abnormal chest x-ray    3. SOB (shortness of breath)    4. Dyspnea on exertion    5. Class 3 severe obesity with body mass index (BMI) of 40.0 to 44.9 in adult, unspecified obesity type, unspecified whether serious comorbidity present    6. Rheumatoid arthritis involving multiple sites, unspecified whether rheumatoid factor present          Outpatient Encounter Medications as of 2/6/2023   Medication Sig Dispense Refill    albuterol (ACCUNEB) 0.63 mg/3 mL Nebu Take 3 mLs (0.63 mg total) by nebulization every 6 (six) hours as needed (coughing,  wheezing). Rescue 100 mL 0    albuterol (PROVENTIL/VENTOLIN HFA) 90 mcg/actuation inhaler INHALE TWO PUFFS INTO THE LUNGS EVERY 6 HOURS AS NEEDED FOR WHEEZING 18 g 1    ARMOUR THYROID 30 mg Tab Take 30 mg by mouth once daily.      cyanocobalamin 1,000 mcg/mL injection INJECT ONE ML INTRAMUSCULARLY once weekly      cyclobenzaprine (FLEXERIL) 10 MG tablet Take 10 mg by mouth 2 (two) times daily as needed.      ELIQUIS 5 mg Tab Take 1 tablet (5 mg total) by mouth 2 (two) times daily. 180 tablet 1    ezetimibe (ZETIA) 10 mg tablet Take 10 mg by mouth once daily.      fenofibrate 160 MG Tab Take 160 mg by mouth once daily.      folic acid (FOLVITE) 1 MG tablet Take 1,000 mcg by mouth once daily.      gabapentin (NEURONTIN) 600 MG tablet Take 600 mg by mouth 3 (three) times daily.      HIZENTRA 10 gram/50 mL (20 %) Soln Inject into the skin.      HIZENTRA 4 gram/20 mL (20 %) Soln       lisinopriL-hydrochlorothiazide (PRINZIDE,ZESTORETIC) 20-25 mg Tab       oxyCODONE-acetaminophen (PERCOCET)  mg per tablet 10/22/21 TAKE ONE TABLET BY MOUTH TWICE DAILY AS NEEDED      OXYCONTIN 20 mg 12 hr tablet 10/29/21 TAKE ONE TABLET BY MOUTH EVERY TWELVE HOURS      OZEMPIC 2 mg/dose (8 mg/3 mL) PnIj Inject 2 mg into the skin every 7 days.      pantoprazole (PROTONIX) 40 MG tablet Take 40 mg by mouth once daily.      predniSONE (DELTASONE) 10 MG tablet TAKE ONE TABLET BY MOUTH EVERY DAY (WITH 5mg)      predniSONE (DELTASONE) 5 MG tablet Take 15 mg by mouth once daily.      promethazine (PHENERGAN) 25 MG tablet TAKE ONE TABLET BY MOUTH EVERY 4 TO 6 HOURS AS NEEDED      sulfamethoxazole-trimethoprim 800-160mg (BACTRIM DS) 800-160 mg Tab Take 1 tablet by mouth once daily.      TRUE METRIX GLUCOSE TEST STRIP Strp check sugar      valACYclovir (VALTREX) 500 MG tablet Take 500 mg by mouth once daily.      budesonide-formoterol 160-4.5 mcg (SYMBICORT) 160-4.5 mcg/actuation HFAA Inhale 2 puffs into the lungs every 12 (twelve) hours.  Controller 10.2 g 5    cycloSPORINE (RESTASIS) 0.05 % ophthalmic emulsion Apply 1 each to eye.      [DISCONTINUED] cefUROXime (CEFTIN) 500 MG tablet Take 1 tablet (500 mg total) by mouth every 12 (twelve) hours. 20 tablet 0     No facility-administered encounter medications on file as of 2/6/2023.     Orders Placed This Encounter   Procedures    US Lower Extremity Veins Bilateral     Standing Status:   Future     Standing Expiration Date:   2/6/2024     Order Specific Question:   May the Radiologist modify the order per protocol to meet the clinical needs of the patient?     Answer:   Yes     Order Specific Question:   Release to patient     Answer:   Immediate    NM Lung Scan Ventilation Perfusion     Standing Status:   Future     Standing Expiration Date:   2/6/2024     Order Specific Question:   May the Radiologist modify the order per protocol to meet the clinical needs of the patient?     Answer:   Yes     Order Specific Question:   Is the patient  on a ventilator?     Answer:   No    Complete PFT with bronchodilator     Standing Status:   Future     Standing Expiration Date:   2/6/2024     Order Specific Question:   Release to patient     Answer:   Immediate       Plan:       Problem List Items Addressed This Visit          Cardiac/Vascular    Dyspnea on exertion     Patient was in her usual state health until several months ago when she noted acute onset of shortness of breath she has multiple medical problems is on chronic steroids.  Her last chest x-ray which was done on February the 6 with unremarkable.  After history and physical really the patient has multiple reasons for shortness of breath.  I want to characterize pulmonary functions as restrictive or obstructive.  I want to rule out possibly chronic thromboembolic disease.  I have added Symbicort in case there is a component of asthma I think she has probably some ongoing reflux she is gained a lot of weight she is deconditioned.  The will not be a  single answer to her problems but will want to rule out the things we can control and will add some Symbicort         Chest pain - Primary    Relevant Orders    NM Lung Scan Ventilation Perfusion       Immunology/Multi System    Rheumatoid arthritis       Endocrine    Class 3 severe obesity with body mass index (BMI) of 40.0 to 44.9 in adult     Other Visit Diagnoses       Abnormal chest x-ray        SOB (shortness of breath)        Relevant Medications    budesonide-formoterol 160-4.5 mcg (SYMBICORT) 160-4.5 mcg/actuation HFAA    Other Relevant Orders    US Lower Extremity Veins Bilateral    NM Lung Scan Ventilation Perfusion    Complete PFT with bronchodilator

## 2023-02-07 NOTE — ASSESSMENT & PLAN NOTE
Patient was in her usual state health until several months ago when she noted acute onset of shortness of breath she has multiple medical problems is on chronic steroids.  Her last chest x-ray which was done on February the 6 with unremarkable.  After history and physical really the patient has multiple reasons for shortness of breath.  I want to characterize pulmonary functions as restrictive or obstructive.  I want to rule out possibly chronic thromboembolic disease.  I have added Symbicort in case there is a component of asthma I think she has probably some ongoing reflux she is gained a lot of weight she is deconditioned.  The will not be a single answer to her problems but will want to rule out the things we can control and will add some Symbicort

## 2023-02-10 ENCOUNTER — HOSPITAL ENCOUNTER (OUTPATIENT)
Dept: RADIOLOGY | Facility: HOSPITAL | Age: 58
Discharge: HOME | End: 2023-02-10
Attending: INTERNAL MEDICINE
Payer: COMMERCIAL

## 2023-02-10 ENCOUNTER — CLINICAL SUPPORT (OUTPATIENT)
Dept: PULMONOLOGY | Facility: HOSPITAL | Age: 58
End: 2023-02-10
Attending: INTERNAL MEDICINE
Payer: COMMERCIAL

## 2023-02-10 DIAGNOSIS — R06.02 SOB (SHORTNESS OF BREATH): ICD-10-CM

## 2023-02-10 DIAGNOSIS — R07.9 CHEST PAIN, UNSPECIFIED TYPE: ICD-10-CM

## 2023-02-10 PROCEDURE — 94060 EVALUATION OF WHEEZING: CPT

## 2023-02-10 PROCEDURE — 94060 PR EVAL OF BRONCHOSPASM: ICD-10-PCS | Mod: 26,,, | Performed by: INTERNAL MEDICINE

## 2023-02-10 PROCEDURE — 94726 PULM FUNCT TST PLETHYSMOGRAP: ICD-10-PCS | Mod: 26,,, | Performed by: INTERNAL MEDICINE

## 2023-02-10 PROCEDURE — 78582 NM LUNG VENTILATION AND PERFUSION IMAGING: ICD-10-PCS | Mod: 26,,, | Performed by: RADIOLOGY

## 2023-02-10 PROCEDURE — 94726 PLETHYSMOGRAPHY LUNG VOLUMES: CPT | Mod: 26,,, | Performed by: INTERNAL MEDICINE

## 2023-02-10 PROCEDURE — 93970 US LOWER EXTREMITY VEINS BILATERAL: ICD-10-PCS | Mod: 26,,, | Performed by: RADIOLOGY

## 2023-02-10 PROCEDURE — 78582 LUNG VENTILAT&PERFUS IMAGING: CPT | Mod: 26,,, | Performed by: RADIOLOGY

## 2023-02-10 PROCEDURE — 93970 EXTREMITY STUDY: CPT | Mod: TC

## 2023-02-10 PROCEDURE — 94729 DIFFUSING CAPACITY: CPT | Mod: 26,,, | Performed by: INTERNAL MEDICINE

## 2023-02-10 PROCEDURE — 93970 EXTREMITY STUDY: CPT | Mod: 26,,, | Performed by: RADIOLOGY

## 2023-02-10 PROCEDURE — 94726 PLETHYSMOGRAPHY LUNG VOLUMES: CPT

## 2023-02-10 PROCEDURE — 94729 PR C02/MEMBANE DIFFUSE CAPACITY: ICD-10-PCS | Mod: 26,,, | Performed by: INTERNAL MEDICINE

## 2023-02-10 PROCEDURE — 94729 DIFFUSING CAPACITY: CPT

## 2023-02-10 PROCEDURE — A9539 TC99M PENTETATE: HCPCS | Mod: XB

## 2023-02-10 PROCEDURE — 94060 EVALUATION OF WHEEZING: CPT | Mod: 26,,, | Performed by: INTERNAL MEDICINE

## 2023-02-13 ENCOUNTER — TELEPHONE (OUTPATIENT)
Dept: PULMONOLOGY | Facility: CLINIC | Age: 58
End: 2023-02-13
Payer: COMMERCIAL

## 2023-02-13 NOTE — PROCEDURES
Pulmonary function test  Forced vital capacity 1.95 L 64% predicted  FEV1 1.69 L 70% predicted   FEV1 ratio 87%   Decreased lung volumes   Decreased ERV at 27%   Normal DLCO  Mild-to-moderate restrictive ventilatory impairment normal DLCO decreased ERV

## 2023-02-14 NOTE — TELEPHONE ENCOUNTER
"Test results were phoned to  (first number listed)  And then to .    They voiced understanding.    VQ Lung scan was negative, no Pulmonary Embolus noted.  Doppler of legs was Negative, no DVT "clot" noted.  Pulmonary Function Test showed "mild to moderate  Restrictive impairment.  Reinforced that MD will review recommendations and  was encouraged to keep follow-up appointment.     asked (paraphrased) "then why is my hearrt racing when walking?"  She added that Cardiology said her "heart was fine"  (again paraphrased).   She was encouraged to report this to her Cardio team,  to pace and rest between activities,  and to use Albuterol HFA when needed but not to overuse it.     Addendum 2/17/23    MD reviewed above message re: pt issue with "heart racing" with walking.  New order received and called to .  She reported issue was present prior to use of Symbicort, and issue with poss med affordability: she did say she may try new inhaler for response.  She was encouraged to salazar again, prn.  Per 's request new med order called to [facility] voice mail.    Flovent 220 2 Puffs BID: rinse mouth after use: Disp1 RF5.  Stop Symbicort when starting Flovent. VO Rachell/gp      "

## 2023-02-17 RX ORDER — FLUTICASONE PROPIONATE 220 UG/1
2 AEROSOL, METERED RESPIRATORY (INHALATION) 2 TIMES DAILY
COMMUNITY

## 2023-03-28 ENCOUNTER — DOCUMENT SCAN (OUTPATIENT)
Dept: HOME HEALTH SERVICES | Facility: HOSPITAL | Age: 58
End: 2023-03-28
Payer: COMMERCIAL

## 2023-05-05 ENCOUNTER — OFFICE VISIT (OUTPATIENT)
Dept: FAMILY MEDICINE | Facility: CLINIC | Age: 58
End: 2023-05-05
Payer: COMMERCIAL

## 2023-05-05 VITALS
DIASTOLIC BLOOD PRESSURE: 88 MMHG | WEIGHT: 251 LBS | HEIGHT: 62 IN | SYSTOLIC BLOOD PRESSURE: 136 MMHG | OXYGEN SATURATION: 95 % | TEMPERATURE: 98 F | BODY MASS INDEX: 46.19 KG/M2 | RESPIRATION RATE: 19 BRPM | HEART RATE: 105 BPM

## 2023-05-05 DIAGNOSIS — I10 PRIMARY HYPERTENSION: ICD-10-CM

## 2023-05-05 DIAGNOSIS — R30.0 DYSURIA: ICD-10-CM

## 2023-05-05 DIAGNOSIS — Z09 HOSPITAL DISCHARGE FOLLOW-UP: Primary | ICD-10-CM

## 2023-05-05 DIAGNOSIS — N30.00 ACUTE CYSTITIS WITHOUT HEMATURIA: ICD-10-CM

## 2023-05-05 LAB
BILIRUB SERPL-MCNC: NORMAL MG/DL
BLOOD URINE, POC: NORMAL
COLOR, POC UA: NORMAL
GLUCOSE UR QL STRIP: NORMAL
KETONES UR QL STRIP: NORMAL
LEUKOCYTE ESTERASE URINE, POC: NORMAL
NITRITE, POC UA: NORMAL
PH, POC UA: 7
PROTEIN, POC: NORMAL
SPECIFIC GRAVITY, POC UA: 1.02
UROBILINOGEN, POC UA: 0.2

## 2023-05-05 PROCEDURE — 81003 URINALYSIS AUTO W/O SCOPE: CPT | Mod: RHCUB | Performed by: NURSE PRACTITIONER

## 2023-05-05 PROCEDURE — 99212 PR OFFICE/OUTPT VISIT, EST, LEVL II, 10-19 MIN: ICD-10-PCS | Mod: ,,, | Performed by: NURSE PRACTITIONER

## 2023-05-05 PROCEDURE — 81003 PR URINALYSIS, AUTO, W/O SCOPE: ICD-10-PCS | Mod: QW,,, | Performed by: NURSE PRACTITIONER

## 2023-05-05 PROCEDURE — 87086 CULTURE, URINE: ICD-10-PCS | Mod: ,,, | Performed by: CLINICAL MEDICAL LABORATORY

## 2023-05-05 PROCEDURE — 87086 URINE CULTURE/COLONY COUNT: CPT | Mod: ,,, | Performed by: CLINICAL MEDICAL LABORATORY

## 2023-05-05 PROCEDURE — 99212 OFFICE O/P EST SF 10 MIN: CPT | Mod: ,,, | Performed by: NURSE PRACTITIONER

## 2023-05-05 PROCEDURE — 81003 URINALYSIS AUTO W/O SCOPE: CPT | Mod: QW,,, | Performed by: NURSE PRACTITIONER

## 2023-05-05 RX ORDER — LISINOPRIL 20 MG/1
20 TABLET ORAL DAILY
Qty: 90 TABLET | Refills: 1 | Status: SHIPPED | OUTPATIENT
Start: 2023-05-05 | End: 2024-05-04

## 2023-05-05 RX ORDER — CEFUROXIME AXETIL 500 MG/1
500 TABLET ORAL EVERY 12 HOURS
Qty: 20 TABLET | Refills: 0 | Status: SHIPPED | OUTPATIENT
Start: 2023-05-05

## 2023-05-05 RX ORDER — METFORMIN HYDROCHLORIDE 500 MG/1
500 TABLET, EXTENDED RELEASE ORAL 2 TIMES DAILY
COMMUNITY
Start: 2023-04-27

## 2023-05-05 NOTE — PROGRESS NOTES
OMAR Hopper        PATIENT NAME: Hawa Street  : 1965  DATE: 23  MRN: [id number]      Billing Provider: OMAR Hopper  Level of Service: OR OFFICE/OUTPT VISIT, EST, LEVL II, 10-19 MIN  Patient PCP Information       Provider PCP Type    OMAR Hopper General            Reason for Visit / Chief Complaint: Hospital Follow Up (D/C 23 for leukocytosis, nausea, and constipation. Reports blood pressures have been fluctuating here recently along with blood sugars.) and Care Gaps (Pt defers HepC screening and covid/pneumonia vaccines at this time)         History of Present Illness / Problem Focused Workflow     Hawa Street presents to the clinic with Hospital Follow Up (D/C 23 for leukocytosis, nausea, and constipation. Reports blood pressures have been fluctuating here recently along with blood sugars.) and Care Gaps (Pt defers HepC screening and covid/pneumonia vaccines at this time)     Ms. Street presents in hospital follow-up for UTI, she is complaining of low back pain similar to what she has had in the past with urinary tract infection.  She has trace blood in her urine dip today, urine culture is pending.  She states she was instructed to stop hydrochlorothiazide so we will adjust her medications accordingly.  She has home health that will be following her as well as physical and occupational therapy, I will ask them to repeat a urinalysis in 1 month.  She is seeing Endocrinology in [place] for diabetic treatment.  Medications were adjusted this hospitalization.  I have reviewed hospital records, discharge medications were reconciled with current medication list.      Review of Systems     Review of Systems   Constitutional: Negative.    Respiratory: Negative.     Cardiovascular: Negative.    Genitourinary:  Positive for dysuria.   Musculoskeletal:  Positive for back pain.   Neurological: Negative.    Psychiatric/Behavioral: Negative.        Medical /  Social / Family History     Past Medical History:   Diagnosis Date    Anxiety     Common variable immunodeficiency     Hashimoto's disease     History of osteopenia     History of pulmonary embolism     Hypertension     Hypothyroidism     Kidney stone     Myalgia     Nontoxic multinodular goiter     Other long term (current) drug therapy     Rheumatoid arthritis     Sicca syndrome     Toxoplasmosis     Vitamin D deficiency        Past Surgical History:   Procedure Laterality Date     SECTION      ESOPHAGOGASTRODUODENOSCOPY      HIP SURGERY      HYSTERECTOMY      LAPAROSCOPIC GASTRIC BANDING      SHOULDER SURGERY Right     TONSILLECTOMY      TUBAL LIGATION         Social History  Ms. Hawa Street   reports that she has been smoking vaping with nicotine. She has never used smokeless tobacco. She reports that she does not currently use alcohol. She reports that she does not use drugs.    Family History  Ms.'s Hawa Street  family history includes Cancer in her father and mother; Heart disease in her father; Prostate cancer in her father.    Medications and Allergies     Medications  Outpatient Medications Marked as Taking for the 23 encounter (Office Visit) with OMAR Hopper   Medication Sig Dispense Refill    albuterol (ACCUNEB) 0.63 mg/3 mL Nebu Take 3 mLs (0.63 mg total) by nebulization every 6 (six) hours as needed (coughing, wheezing). Rescue 100 mL 0    albuterol (PROVENTIL/VENTOLIN HFA) 90 mcg/actuation inhaler INHALE TWO PUFFS INTO THE LUNGS EVERY 6 HOURS AS NEEDED FOR WHEEZING 18 g 1    ARMOUR THYROID 30 mg Tab Take 30 mg by mouth once daily.      cyanocobalamin 1,000 mcg/mL injection INJECT ONE ML INTRAMUSCULARLY once weekly      cyclobenzaprine (FLEXERIL) 10 MG tablet Take 10 mg by mouth 2 (two) times daily as needed.      ELIQUIS 5 mg Tab Take 1 tablet (5 mg total) by mouth 2 (two) times daily. 180 tablet 1    ezetimibe (ZETIA) 10 mg tablet Take 10 mg by mouth once  daily.      fenofibrate 160 MG Tab Take 160 mg by mouth once daily.      fluticasone propionate (FLOVENT HFA) 220 mcg/actuation inhaler Inhale 2 puffs into the lungs 2 (two) times daily. Rinse mouth after use.  New order 2/16/23 Controller      folic acid (FOLVITE) 1 MG tablet Take 1,000 mcg by mouth once daily.      gabapentin (NEURONTIN) 600 MG tablet Take 600 mg by mouth 3 (three) times daily.      HIZENTRA 10 gram/50 mL (20 %) Soln Inject into the skin.      HIZENTRA 4 gram/20 mL (20 %) Soln       metFORMIN (GLUCOPHAGE-XR) 500 MG ER 24hr tablet Take 500 mg by mouth 2 (two) times daily.      oxyCODONE-acetaminophen (PERCOCET)  mg per tablet 10/22/21 TAKE ONE TABLET BY MOUTH TWICE DAILY AS NEEDED      OXYCONTIN 20 mg 12 hr tablet 10/29/21 TAKE ONE TABLET BY MOUTH EVERY TWELVE HOURS      pantoprazole (PROTONIX) 40 MG tablet Take 40 mg by mouth once daily.      predniSONE (DELTASONE) 10 MG tablet TAKE ONE TABLET BY MOUTH EVERY DAY (WITH 5mg)      promethazine (PHENERGAN) 25 MG tablet TAKE ONE TABLET BY MOUTH EVERY 4 TO 6 HOURS AS NEEDED      sulfamethoxazole-trimethoprim 800-160mg (BACTRIM DS) 800-160 mg Tab Take 1 tablet by mouth once daily.      TRUE METRIX GLUCOSE TEST STRIP Strp check sugar      valACYclovir (VALTREX) 500 MG tablet Take 500 mg by mouth once daily.      [DISCONTINUED] lisinopriL-hydrochlorothiazide (PRINZIDE,ZESTORETIC) 20-25 mg Tab          Allergies  Review of patient's allergies indicates:   Allergen Reactions    Nsaids (non-steroidal anti-inflammatory drug)     Statins-hmg-coa reductase inhibitors          Vitals:    05/05/23 1451   BP: 136/88   Pulse: 105   Resp: 19   Temp: 98.4 °F (36.9 °C)     Physical Exam  Vitals and nursing note reviewed.   Constitutional:       General: She is not in acute distress.     Appearance: Normal appearance. She is obese. She is not ill-appearing.   HENT:      Head: Normocephalic.   Cardiovascular:      Rate and Rhythm: Normal rate and regular rhythm.       Heart sounds: Normal heart sounds.   Pulmonary:      Effort: Pulmonary effort is normal.      Breath sounds: Normal breath sounds.   Abdominal:      General: Bowel sounds are normal.      Palpations: Abdomen is soft.      Tenderness: There is right CVA tenderness and left CVA tenderness.   Musculoskeletal:         General: Normal range of motion.   Skin:     General: Skin is warm and dry.   Neurological:      Mental Status: She is alert and oriented to person, place, and time.   Psychiatric:         Behavior: Behavior normal.          Lab Results   Component Value Date    WBC 24.74 (H) 10/19/2022    HGB 12.5 10/19/2022    HCT 39.8 10/19/2022    MCV 93.9 10/19/2022     (H) 10/19/2022          Sodium   Date Value Ref Range Status   10/19/2022 136 136 - 145 mmol/L Final     Potassium   Date Value Ref Range Status   10/19/2022 4.2 3.5 - 5.1 mmol/L Final     Chloride   Date Value Ref Range Status   10/19/2022 98 98 - 107 mmol/L Final     CO2   Date Value Ref Range Status   10/19/2022 28 21 - 32 mmol/L Final     Glucose   Date Value Ref Range Status   10/19/2022 88 74 - 106 mg/dL Final     BUN   Date Value Ref Range Status   10/19/2022 28 (H) 7 - 18 mg/dL Final     Creatinine   Date Value Ref Range Status   10/19/2022 1.45 (H) 0.55 - 1.02 mg/dL Final     Calcium   Date Value Ref Range Status   10/19/2022 10.4 (H) 8.5 - 10.1 mg/dL Final     Total Protein   Date Value Ref Range Status   10/19/2022 8.2 6.4 - 8.2 g/dL Final     Albumin   Date Value Ref Range Status   10/19/2022 3.5 3.5 - 5.0 g/dL Final     Bilirubin, Total   Date Value Ref Range Status   10/19/2022 0.3 >0.0 - 1.2 mg/dL Final     Alk Phos   Date Value Ref Range Status   10/19/2022 26 (L) 46 - 118 U/L Final     AST   Date Value Ref Range Status   10/19/2022 36 15 - 37 U/L Final     ALT   Date Value Ref Range Status   10/19/2022 38 13 - 56 U/L Final     Anion Gap   Date Value Ref Range Status   10/19/2022 14 7 - 16 mmol/L Final     eGFR   Date Value Ref  Range Status   10/19/2022 42 (L) >=60 mL/min/1.73m² Final        No results found for: CHOL  No results found for: HDL  No results found for: LDLCALC  No results found for: TRIG  No results found for: CHOLHDL     No results found for: LABA1C, HGBA1C     No results found for: TSH, W4LOCEW, L0WWUQE, THYROIDAB, FREET4     No results found for: PSA, PSATOTAL, PSAFREE, PSAFREEPCT       Health Maintenance Due   Topic Date Due    Hepatitis C Screening  Never done    Lipid Panel  Never done    COVID-19 Vaccine (1) Never done    Pneumococcal Vaccines (Age 0-64) (1 - PCV) Never done    Hemoglobin A1c (Diabetic Prevention Screening)  Never done    Colorectal Cancer Screening  Never done    Mammogram  06/09/2023       Problem List Items Addressed This Visit          Cardiac/Vascular    Hypertension    Relevant Medications    lisinopriL (PRINIVIL,ZESTRIL) 20 MG tablet     Other Visit Diagnoses       Hospital discharge follow-up    -  Primary    Dysuria        Relevant Orders    POCT URINALYSIS W/O SCOPE (Completed)    Urine culture    Acute cystitis without hematuria        Relevant Medications    cefUROXime (CEFTIN) 500 MG tablet    Other Relevant Orders    Urine culture            Stop Zestoretic, start lisinopril 20 mg daily.  [facility] ([facility]) to obtain repeat urinalysis in 1 month.  Urine dip with trace blood, urine culture pending.    Health Maintenance Topics with due status: Not Due       Topic Last Completion Date    Influenza Vaccine Not Due       Future Appointments   Date Time Provider Department Center   5/8/2023  3:40 PM Travis Grayson MD [facility]        There are no Patient Instructions on file for this visit.  No follow-ups on file.       Date of encounter: 5/5/23

## 2023-05-07 LAB — UA COMPLETE W REFLEX CULTURE PNL UR: NO GROWTH

## 2023-05-25 ENCOUNTER — DOCUMENT SCAN (OUTPATIENT)
Dept: HOME HEALTH SERVICES | Facility: HOSPITAL | Age: 58
End: 2023-05-25
Payer: COMMERCIAL

## 2023-05-26 ENCOUNTER — EXTERNAL HOME HEALTH (OUTPATIENT)
Dept: HOME HEALTH SERVICES | Facility: HOSPITAL | Age: 58
End: 2023-05-26
Payer: COMMERCIAL

## 2023-12-12 ENCOUNTER — OFFICE VISIT (OUTPATIENT)
Dept: FAMILY MEDICINE | Facility: CLINIC | Age: 58
End: 2023-12-12
Payer: COMMERCIAL

## 2023-12-12 DIAGNOSIS — R32 URINARY INCONTINENCE, UNSPECIFIED TYPE: Primary | ICD-10-CM

## 2023-12-12 DIAGNOSIS — Z74.01 BEDBOUND: ICD-10-CM

## 2023-12-12 DIAGNOSIS — E11.65 UNCONTROLLED TYPE 2 DIABETES MELLITUS WITH HYPERGLYCEMIA: ICD-10-CM

## 2023-12-12 DIAGNOSIS — E66.01 CLASS 3 SEVERE OBESITY WITH BODY MASS INDEX (BMI) OF 40.0 TO 44.9 IN ADULT, UNSPECIFIED OBESITY TYPE, UNSPECIFIED WHETHER SERIOUS COMORBIDITY PRESENT: ICD-10-CM

## 2023-12-12 DIAGNOSIS — I10 PRIMARY HYPERTENSION: ICD-10-CM

## 2023-12-12 DIAGNOSIS — R53.83 FATIGUE, UNSPECIFIED TYPE: ICD-10-CM

## 2023-12-12 DIAGNOSIS — R60.0 PEDAL EDEMA: ICD-10-CM

## 2023-12-12 PROCEDURE — 4010F ACE/ARB THERAPY RXD/TAKEN: CPT | Mod: 95,,, | Performed by: FAMILY MEDICINE

## 2023-12-12 PROCEDURE — 1160F PR REVIEW ALL MEDS BY PRESCRIBER/CLIN PHARMACIST DOCUMENTED: ICD-10-PCS | Mod: 95,,, | Performed by: FAMILY MEDICINE

## 2023-12-12 PROCEDURE — 99214 PR OFFICE/OUTPT VISIT, EST, LEVL IV, 30-39 MIN: ICD-10-PCS | Mod: 95,GC,, | Performed by: FAMILY MEDICINE

## 2023-12-12 PROCEDURE — 1159F MED LIST DOCD IN RCRD: CPT | Mod: 95,,, | Performed by: FAMILY MEDICINE

## 2023-12-12 PROCEDURE — 4010F PR ACE/ARB THEARPY RXD/TAKEN: ICD-10-PCS | Mod: 95,,, | Performed by: FAMILY MEDICINE

## 2023-12-12 PROCEDURE — 1159F PR MEDICATION LIST DOCUMENTED IN MEDICAL RECORD: ICD-10-PCS | Mod: 95,,, | Performed by: FAMILY MEDICINE

## 2023-12-12 PROCEDURE — 1160F RVW MEDS BY RX/DR IN RCRD: CPT | Mod: 95,,, | Performed by: FAMILY MEDICINE

## 2023-12-12 PROCEDURE — 99214 OFFICE O/P EST MOD 30 MIN: CPT | Mod: 95,GC,, | Performed by: FAMILY MEDICINE

## 2023-12-12 RX ORDER — FUROSEMIDE 20 MG/1
20 TABLET ORAL DAILY
Qty: 90 TABLET | Refills: 0 | Status: SHIPPED | OUTPATIENT
Start: 2023-12-12 | End: 2024-03-11

## 2023-12-12 RX ORDER — INSULIN GLARGINE 300 U/ML
25 INJECTION, SOLUTION SUBCUTANEOUS DAILY
Qty: 7.5 ML | Refills: 0 | Status: SHIPPED | OUTPATIENT
Start: 2023-12-12 | End: 2024-03-11

## 2023-12-12 NOTE — ASSESSMENT & PLAN NOTE
- Increase the lantus from 15 units to 25 units.  - started Jardiance  - monitor blood glucose at home

## 2023-12-12 NOTE — PROGRESS NOTES
Gynecology Progress Note    S:    pt doing well.  sitting in chair.  tolerated gen diet for lunch.  has not taken any po meds as pain controlled.  plans home today.       Surgical findings explained and plan for the day detailed.  Questions answered.    O:    Vitals: Vital Signs (last 24 hrs)_____  Last Charted___________  Temp Oral      98.1 F  (NOV 07 11:49)  Heart Rate Peripheral   L 55bpm  (NOV 07 11:49)  Resp Rate          18 br/min  (NOV 07 11:49)  SBP      108 mmHg  (NOV 07 11:49)  DBP      65 mmHg  (NOV 07 11:49)      Gen:  NAD  Abd: soft nontender nondistended NABS   Pelvic: deferred  Back: no CVAT b/l  Ext:  nontender, no swelling, SCDs on.    Labs:  Labs (Last four charted values)  WBC                  H 12.0 (NOV 07)   Hgb                  L 9.9 (NOV 07) 12.8 (OCT 30)   Hct                  L 30 (NOV 07) 40 (OCT 30)   Plt                  196 (NOV 07)     Assessment:  POD#1  from [facility] B[facility], AR, SSLS, cysto.    Plan:    1. Postop: doing well, advance diet as tolerated, ambulate, voiding, passing flatus.   2. Heme: stable  3. Dispo: today follow up in 2 weeks with Dr. mansfield.   Observe 6 weeks of pelvic rest, no heaving lifting greater than 10lbs, 1-2weeks no driving.  Call office with any problems.     ________________________________________________________  Jeness M. Barthel, MD [id number]           The patient location is: Home ( [place])  The chief complaint leading to consultation is: Patient has history of recurrent UTIs and wanted to have visit with a physician so that appropriate tests and labs can be ordered.    Visit type: audiovisual    Notes:    Subjective:    HPI: 58 y.o. female with pertinent PMHx ofAdditional HPI - ***    Review of Systems:   Negative unless otherwise noted positive-  Gen- Weakness/ Fatigue  Neuro- Confusion  CV- Chest Pain/ Palpitations  Resp: Cough/ SOB  GI- Nausea/Vomiting  Extrem- Pain/Swelling        Plan:    {[facility] PLAN:57662}      Face to Face time with patient: ***  *** minutes of total time spent on the encounter, which includes face to face time and non-face to face time preparing to see the patient (eg, review of tests), Obtaining and/or reviewing separately obtained history, Documenting clinical information in the electronic or other health record, Independently interpreting results (not separately reported) and communicating results to the patient/family/caregiver, or Care coordination (not separately reported).     Each patient to whom he or she provides medical services by telemedicine is:  (1) informed of the relationship between the physician and patient and the respective role of any other health care provider with respect to management of the patient; and (2) notified that he or she may decline to receive medical services by telemedicine and may withdraw from such care at any time.

## 2023-12-12 NOTE — PROGRESS NOTES
Subjective     Patient ID: Hawa Street is a 58 y.o. female.    Chief Complaint: No chief complaint on file.    Patient is a 57 yo female with PMH of HTN, HLD, DM type 2, PE, GERD and osteoarthritis. Patient had a telehealth visit today for ongoing urinary symptoms, fatigue, uncontrolled blood glucose, and pedal edema. Patient was connected via V[employer] for her visit. There was audio as well as video connection during the 25 minutes call.   Patient was lying down in her bed at the time of encounter and was not in acute distress. She reported that she has been doing well as far as her health is concerned. She seemed concerned about her back pain that she suspects is from UTI (that's how it has presented in the past). She has noticed some incontinence for last few days but denies any abdominal/pelvic pain.discomfort, urgency. Increased frequency, dysuria, chills or fever. She uses bed pans to urinate.   Her BP has been in 140's/80's and she is compliant with medications. She is not physically active. She denies chest pain, palpitations, dizziness, or SOB.  Patient has notice facial swelling and bilateral leg swelling. The leg swelling is not painful and is resolved with Lasix.  Patient's blood glucose has been in in high 200's,. She is compliant with the prescribed dose of insulin.        Review of Systems   Constitutional:  Positive for activity change and fatigue. Negative for appetite change, chills, diaphoresis, fever and unexpected weight change.   HENT:  Negative for trouble swallowing.    Respiratory:  Negative for cough, choking, chest tightness, shortness of breath and wheezing.    Cardiovascular:  Positive for leg swelling. Negative for chest pain, palpitations and claudication.   Gastrointestinal:  Positive for constipation. Negative for abdominal distention, abdominal pain, diarrhea, nausea and vomiting.   Genitourinary:  Positive for bladder incontinence. Negative for decreased urine volume,  difficulty urinating, dysuria, enuresis, flank pain, frequency, hematuria and urgency.   Musculoskeletal:  Positive for back pain.   Integumentary:  Negative for rash.   Neurological:  Negative for dizziness, syncope, weakness, light-headedness, numbness and headaches.               Assessment and Plan     1. Urinary incontinence, unspecified type  Assessment & Plan:  - urine culture      2. Uncontrolled type 2 diabetes mellitus with hyperglycemia  Assessment & Plan:  - Increase the lantus from 15 units to 25 units.  - started Jardiance  - monitor blood glucose at home        3. Pedal edema  Assessment & Plan:  - continue taking Lasix as prescribed  - monitor BP        4. Fatigue, unspecified type    5. Primary hypertension  Assessment & Plan:  - monitor BP at home.  - compliance with prescribed medication  - work with physical therapy personnel to be more ambulatory      6. Class 3 severe obesity with body mass index (BMI) of 40.0 to 44.9 in adult, unspecified obesity type, unspecified whether serious comorbidity present  Assessment & Plan:  - referred to home health services if patient can benefit from home aid services                 Follow up if symptoms worsen or fail to improve, for Virtual Visit.

## 2023-12-12 NOTE — ASSESSMENT & PLAN NOTE
- monitor BP at home.  - compliance with prescribed medication  - work with physical therapy personnel to be more ambulatory

## 2024-01-08 ENCOUNTER — OFFICE VISIT (OUTPATIENT)
Dept: FAMILY MEDICINE | Facility: CLINIC | Age: 59
End: 2024-01-08
Payer: COMMERCIAL

## 2024-01-08 VITALS
TEMPERATURE: 98 F | OXYGEN SATURATION: 96 % | RESPIRATION RATE: 16 BRPM | BODY MASS INDEX: 43.41 KG/M2 | HEIGHT: 63 IN | DIASTOLIC BLOOD PRESSURE: 79 MMHG | WEIGHT: 245 LBS | HEART RATE: 99 BPM | SYSTOLIC BLOOD PRESSURE: 123 MMHG

## 2024-01-08 DIAGNOSIS — M06.9 RHEUMATOID ARTHRITIS INVOLVING MULTIPLE SITES, UNSPECIFIED WHETHER RHEUMATOID FACTOR PRESENT: ICD-10-CM

## 2024-01-08 DIAGNOSIS — E03.9 HYPOTHYROIDISM, UNSPECIFIED TYPE: ICD-10-CM

## 2024-01-08 DIAGNOSIS — D83.9 COMMON VARIABLE IMMUNODEFICIENCY: ICD-10-CM

## 2024-01-08 DIAGNOSIS — E11.65 UNCONTROLLED TYPE 2 DIABETES MELLITUS WITH HYPERGLYCEMIA: ICD-10-CM

## 2024-01-08 DIAGNOSIS — R53.83 FATIGUE, UNSPECIFIED TYPE: ICD-10-CM

## 2024-01-08 DIAGNOSIS — I10 PRIMARY HYPERTENSION: ICD-10-CM

## 2024-01-08 DIAGNOSIS — R60.0 PEDAL EDEMA: ICD-10-CM

## 2024-01-08 DIAGNOSIS — E66.01 MORBID OBESITY: ICD-10-CM

## 2024-01-08 DIAGNOSIS — E78.5 HYPERLIPIDEMIA, UNSPECIFIED HYPERLIPIDEMIA TYPE: ICD-10-CM

## 2024-01-08 DIAGNOSIS — L30.4 INTERTRIGO: ICD-10-CM

## 2024-01-08 DIAGNOSIS — E66.01 CLASS 3 SEVERE OBESITY WITH BODY MASS INDEX (BMI) OF 40.0 TO 44.9 IN ADULT, UNSPECIFIED OBESITY TYPE, UNSPECIFIED WHETHER SERIOUS COMORBIDITY PRESENT: ICD-10-CM

## 2024-01-08 DIAGNOSIS — I26.99 PULMONARY EMBOLISM, UNSPECIFIED CHRONICITY, UNSPECIFIED PULMONARY EMBOLISM TYPE, UNSPECIFIED WHETHER ACUTE COR PULMONALE PRESENT: Primary | ICD-10-CM

## 2024-01-08 DIAGNOSIS — Z09 NEED FOR FOLLOW-UP BY HOME HEALTH SERVICE: ICD-10-CM

## 2024-01-08 DIAGNOSIS — G43.909 MIGRAINE WITHOUT STATUS MIGRAINOSUS, NOT INTRACTABLE, UNSPECIFIED MIGRAINE TYPE: ICD-10-CM

## 2024-01-08 PROCEDURE — 3078F DIAST BP <80 MM HG: CPT | Mod: ,,, | Performed by: SPECIALIST

## 2024-01-08 PROCEDURE — 80050 GENERAL HEALTH PANEL: CPT | Mod: ,,, | Performed by: CLINICAL MEDICAL LABORATORY

## 2024-01-08 PROCEDURE — 99214 OFFICE O/P EST MOD 30 MIN: CPT | Mod: GC,,, | Performed by: SPECIALIST

## 2024-01-08 PROCEDURE — 1159F MED LIST DOCD IN RCRD: CPT | Mod: ,,, | Performed by: SPECIALIST

## 2024-01-08 PROCEDURE — 83036 HEMOGLOBIN GLYCOSYLATED A1C: CPT | Mod: ,,, | Performed by: CLINICAL MEDICAL LABORATORY

## 2024-01-08 PROCEDURE — 1160F RVW MEDS BY RX/DR IN RCRD: CPT | Mod: ,,, | Performed by: SPECIALIST

## 2024-01-08 PROCEDURE — 3074F SYST BP LT 130 MM HG: CPT | Mod: ,,, | Performed by: SPECIALIST

## 2024-01-08 PROCEDURE — 80061 LIPID PANEL: CPT | Mod: ,,, | Performed by: CLINICAL MEDICAL LABORATORY

## 2024-01-08 PROCEDURE — 3044F HG A1C LEVEL LT 7.0%: CPT | Mod: ,,, | Performed by: SPECIALIST

## 2024-01-08 PROCEDURE — 3008F BODY MASS INDEX DOCD: CPT | Mod: ,,, | Performed by: SPECIALIST

## 2024-01-08 RX ORDER — INSULIN PUMP SYRINGE, 3 ML
EACH MISCELLANEOUS
Qty: 1 EACH | Refills: 0 | Status: SHIPPED | OUTPATIENT
Start: 2024-01-08 | End: 2025-01-07

## 2024-01-08 RX ORDER — NYSTATIN 100000 [USP'U]/G
POWDER TOPICAL 3 TIMES DAILY
Qty: 60 G | Refills: 3 | Status: SHIPPED | OUTPATIENT
Start: 2024-01-08

## 2024-01-08 NOTE — ASSESSMENT & PLAN NOTE
- ambulatory referral was made to neurology (patient wanted to see some one preferably in [place]).  - follow up if the symptoms worsen or fail to improve.

## 2024-01-08 NOTE — PROGRESS NOTES
Kaylin Gilman MD MPH  [address], [place] [address]  Phone: [phone]     Subjective     Name: Hawa Street   YOB: 1965 (58 y.o.)  MRN: [id number]  Visit Date: 2024   Chief Complaint: Follow-up, Establish Care, and Urinary Tract Infection        HISTORY OF PRESENT ILLNESS:  Patient is a 57 yo female with PMH of diabetes mellitus, HTN, HLD, morbid obesity, hypothyroidism, depression, and recurrent UTIs. She came to the clinic to establish care.   She was accompanied by her  at the time of encounter.  She is compliant with her daily prescribed medications and dietary restrictions.she monitors BP at home and it ranges in 130's/80's.she denies chest pain, palpitations, SOB, or dizziness.   Her blood glucose ranges in 140's (FBS). She denies any hypoglycemic episodes.   She has been having itchiness around intertrigal folds (especially in the inguinal folds). She denies any cardiovascular, gastrointestinal, or urinary symptoms at this moment.           PAST MEDICAL HISTORY:  Significant Diagnoses - Patient  has a past medical history of Anxiety, Common variable immunodeficiency, Hashimoto's disease, History of osteopenia, History of pulmonary embolism, Hypertension, Hypothyroidism, Kidney stone, Myalgia, Nontoxic multinodular goiter, Other long term (current) drug therapy, Rheumatoid arthritis, Sicca syndrome, Toxoplasmosis, and Vitamin D deficiency.  Medications - Patient has a current medication list which includes the following long-term medication(s): albuterol, armour thyroid, blood-glucose meter, budesonide-formoterol 160-4.5 mcg, restasis, ezetimibe, fenofibrate, folic acid, furosemide, gabapentin, lisinopril, nystatin, pantoprazole, and valacyclovir.   Allergies - Patient is allergic to nsaids (non-steroidal anti-inflammatory drug) and statins-hmg-coa reductase inhibitors.  Surgeries - Patient  has a past surgical history that includes  section;  Hysterectomy; Hip surgery; Shoulder surgery (Right); Esophagogastroduodenoscopy; Laparoscopic gastric banding; Tonsillectomy; and Tubal ligation.  Family History - Patient family history includes Cancer in her father and mother; Heart disease in her father; Prostate cancer in her father.      SOCIAL HISTORY:  Tobacco - Patient  reports that she has been smoking vaping with nicotine. She has never used smokeless tobacco.   Alcohol - Patient  reports that she does not currently use alcohol.   Recreational Drugs - Patient  reports no history of drug use.       Review of Systems   Constitutional:  Positive for fatigue. Negative for activity change, fever and unexpected weight change.   HENT:  Negative for nasal congestion, hearing loss and mouth dryness.    Eyes: Negative.    Respiratory:  Negative for cough, shortness of breath and wheezing.    Cardiovascular:  Negative for chest pain, palpitations, leg swelling and claudication.   Gastrointestinal:  Negative for change in bowel habit, constipation, diarrhea, nausea and reflux.   Genitourinary:  Negative for decreased urine volume, difficulty urinating, dysuria, flank pain, frequency and urgency.   Musculoskeletal:  Positive for arthralgias.   Neurological:  Positive for headaches. Negative for dizziness and light-headedness.          Past Medical History:   Diagnosis Date    Anxiety     Common variable immunodeficiency     Hashimoto's disease     History of osteopenia     History of pulmonary embolism     Hypertension     Hypothyroidism     Kidney stone     Myalgia     Nontoxic multinodular goiter     Other long term (current) drug therapy     Rheumatoid arthritis     Sicca syndrome     Toxoplasmosis     Vitamin D deficiency         Review of patient's allergies indicates:   Allergen Reactions    Nsaids (non-steroidal anti-inflammatory drug)     Statins-hmg-coa reductase inhibitors         Past Surgical History:   Procedure Laterality Date     SECTION       ESOPHAGOGASTRODUODENOSCOPY      HIP SURGERY      HYSTERECTOMY      LAPAROSCOPIC GASTRIC BANDING      SHOULDER SURGERY Right     TONSILLECTOMY      TUBAL LIGATION          Family History   Problem Relation Age of Onset    Cancer Mother     Cancer Father     Prostate cancer Father     Heart disease Father        Current Outpatient Medications   Medication Instructions    albuterol (PROVENTIL/VENTOLIN HFA) 90 mcg/actuation inhaler INHALE TWO PUFFS INTO THE LUNGS EVERY 6 HOURS AS NEEDED FOR WHEEZING    ARMOUR THYROID 30 mg, Oral, Daily    blood-glucose meter kit Use as instructed    budesonide-formoterol 160-4.5 mcg (SYMBICORT) 160-4.5 mcg/actuation HFAA 2 puffs, Inhalation, Every 12 hours, Controller    cefUROXime (CEFTIN) 500 mg, Oral, Every 12 hours    cyanocobalamin 1,000 mcg/mL injection INJECT ONE ML INTRAMUSCULARLY once weekly    cyclobenzaprine (FLEXERIL) 10 mg, Oral, 2 times daily PRN    cycloSPORINE (RESTASIS) 0.05 % ophthalmic emulsion 1 each, Ophthalmic    ELIQUIS 5 mg, Oral, 2 times daily    empagliflozin (JARDIANCE) 10 mg, Oral, Daily    ezetimibe (ZETIA) 10 mg, Oral, Daily    fenofibrate 160 mg, Oral, Daily    fluticasone propionate (FLOVENT HFA) 220 mcg/actuation inhaler 2 puffs, Inhalation, 2 times daily, Rinse mouth after use.  New order 2/16/23 Controller    folic acid (FOLVITE) 1,000 mcg, Oral, Daily    furosemide (LASIX) 20 mg, Oral, Daily    gabapentin (NEURONTIN) 600 mg, Oral, 3 times daily    HIZENTRA 10 gram/50 mL (20 %) Soln Subcutaneous    HIZENTRA 4 gram/20 mL (20 %) Soln No dose, route, or frequency recorded.    lisinopriL (PRINIVIL,ZESTRIL) 20 mg, Oral, Daily    metFORMIN (GLUCOPHAGE-XR) 500 mg, Oral, 2 times daily    nystatin (MYCOSTATIN) powder Topical (Top), 3 times daily    oxyCODONE-acetaminophen (PERCOCET)  mg per tablet 10/22/21 TAKE ONE TABLET BY MOUTH TWICE DAILY AS NEEDED    OXYCONTIN 20 mg 12 hr tablet 10/29/21 TAKE ONE TABLET BY MOUTH EVERY TWELVE HOURS    pantoprazole  "(PROTONIX) 40 mg, Oral, Daily    predniSONE (DELTASONE) 10 MG tablet TAKE ONE TABLET BY MOUTH EVERY DAY (WITH 5mg)    promethazine (PHENERGAN) 25 MG tablet TAKE ONE TABLET BY MOUTH EVERY 4 TO 6 HOURS AS NEEDED    sulfamethoxazole-trimethoprim 800-160mg (BACTRIM DS) 800-160 mg Tab 1 tablet, Oral, Daily    TOUJEO SOLOSTAR U-300 INSULIN 25 Units, Subcutaneous, Daily    TRUE METRIX GLUCOSE TEST STRIP Strp check sugar    valACYclovir (VALTREX) 500 mg, Oral, Daily        Objective     /79 (BP Location: Right arm, Patient Position: Sitting, BP Method: Large (Automatic))   Pulse 99   Temp 98.1 °F (36.7 °C) (Oral)   Resp 16   Ht 5' 3" (1.6 m)   Wt 111.1 kg (245 lb)   SpO2 96%   BMI 43.40 kg/m²     Physical Exam  Vitals and nursing note reviewed.   Constitutional:       General: She is not in acute distress.     Appearance: Normal appearance. She is obese. She is not ill-appearing.   HENT:      Head: Normocephalic and atraumatic.      Right Ear: Tympanic membrane, ear canal and external ear normal.      Left Ear: Tympanic membrane, ear canal and external ear normal.      Nose: Nose normal. No congestion or rhinorrhea.      Mouth/Throat:      Mouth: Mucous membranes are moist.      Pharynx: Oropharynx is clear. No oropharyngeal exudate or posterior oropharyngeal erythema.   Eyes:      General:         Right eye: No discharge.         Left eye: No discharge.      Extraocular Movements: Extraocular movements intact.      Conjunctiva/sclera: Conjunctivae normal.      Pupils: Pupils are equal, round, and reactive to light.   Cardiovascular:      Rate and Rhythm: Normal rate and regular rhythm.      Pulses: Normal pulses.      Heart sounds: Normal heart sounds. No murmur heard.  Pulmonary:      Effort: Pulmonary effort is normal.      Breath sounds: Normal breath sounds. No wheezing.   Abdominal:      General: Bowel sounds are normal. There is no distension.      Palpations: Abdomen is soft. There is no mass. "   Musculoskeletal:         General: Normal range of motion.      Cervical back: Normal range of motion and neck supple.      Right lower leg: Edema present.      Left lower leg: Edema present.   Skin:     General: Skin is warm.      Capillary Refill: Capillary refill takes less than 2 seconds.      Findings: No rash.   Neurological:      General: No focal deficit present.      Mental Status: She is alert and oriented to person, place, and time. Mental status is at baseline.   Psychiatric:         Mood and Affect: Mood normal.         Behavior: Behavior normal.         Thought Content: Thought content normal.         Judgment: Judgment normal.          All recently obtained labs have been reviewed and discussed in detail with the patient.   Assessment     1. Pulmonary embolism, unspecified chronicity, unspecified pulmonary embolism type, unspecified whether acute cor pulmonale present    2. Intertrigo    3. Uncontrolled type 2 diabetes mellitus with hyperglycemia    4. Bedbound    5. Need for follow-up by home health service    6. Migraine without status migrainosus, not intractable, unspecified migraine type    7. Hyperlipidemia, unspecified hyperlipidemia type    8. Hypothyroidism, unspecified type    9. Primary hypertension    10. Fatigue, unspecified type    11. Class 3 severe obesity with body mass index (BMI) of 40.0 to 44.9 in adult, unspecified obesity type, unspecified whether serious comorbidity present    12. Rheumatoid arthritis involving multiple sites, unspecified whether rheumatoid factor present    13. Common variable immunodeficiency    14. Pedal edema         Plan     Problem List Items Addressed This Visit          Neuro    Migraine without status migrainosus, not intractable     - ambulatory referral was made to neurology (patient wanted to see some one preferably in [place]).  - follow up if the symptoms worsen or fail to improve.         Relevant Orders    Ambulatory referral/consult to  Neurology    LIPID PANEL    Hemoglobin A1C    TSH    CBC Auto Differential    Comprehensive Metabolic Panel       Derm    Intertrigo     - Nystain powder on the affected areas  - try keeping the affected area dry  -maintain skin hygiene  - Follow up if symptoms worsen or fail to resolve           Relevant Medications    nystatin (MYCOSTATIN) powder    Other Relevant Orders    LIPID PANEL    Hemoglobin A1C    TSH    CBC Auto Differential    Comprehensive Metabolic Panel       Cardiac/Vascular    Hypertension     - monitor BP at home  - continue taking daily prescribed anti hypertensive medications  - monitor symptoms like headaches, blurred vision, dizziness, Chest pain , or palpitations.  -Follow up if symptoms worsen or fail to resolve           Relevant Orders    Comprehensive Metabolic Panel    LIPID PANEL    Hemoglobin A1C    TSH    CBC Auto Differential    Comprehensive Metabolic Panel    Hyperlipidemia     - continue hyperlipidemia medications  - continue dietary restrictions.  - Lipid panel was ordered         Relevant Orders    Lipid Panel    LIPID PANEL    Hemoglobin A1C    TSH    CBC Auto Differential    Comprehensive Metabolic Panel       Immunology/Multi System    Common variable immunodeficiency    Rheumatoid arthritis       Hematology    Pulmonary embolism, unspecified chronicity, unspecified pulmonary embolism type, unspecified whether acute cor pulmonale present - Primary       Endocrine    Hypothyroidism     - continue taking the prescribes medication  - TSH levels were ordered         Relevant Orders    TSH    LIPID PANEL    Hemoglobin A1C    TSH    CBC Auto Differential    Comprehensive Metabolic Panel    Class 3 severe obesity with body mass index (BMI) of 40.0 to 44.9 in adult    Uncontrolled type 2 diabetes mellitus with hyperglycemia    Relevant Medications    blood-glucose meter kit    Other Relevant Orders    Hemoglobin A1C    LIPID PANEL    Hemoglobin A1C    TSH    CBC Auto Differential     Comprehensive Metabolic Panel       Palliative Care    Need for follow-up by home health service     - patient needs to continue home health services for continuation of physical therapy         Relevant Orders    Ambulatory referral/consult to Home Health    LIPID PANEL    Hemoglobin A1C    TSH    CBC Auto Differential    Comprehensive Metabolic Panel       Other    Pedal edema    Fatigue    Relevant Orders    CBC Auto Differential    LIPID PANEL    Hemoglobin A1C    TSH    CBC Auto Differential    Comprehensive Metabolic Panel     Other Visit Diagnoses       Bedbound        Relevant Orders    Ambulatory referral/consult to Home Health    LIPID PANEL    Hemoglobin A1C    TSH    CBC Auto Differential    Comprehensive Metabolic Panel              All orders:  Orders Placed This Encounter    Comprehensive Metabolic Panel    CBC Auto Differential    TSH    Hemoglobin A1C    Lipid Panel    LIPID PANEL    Hemoglobin A1C    TSH    CBC Auto Differential    Comprehensive Metabolic Panel    CBC with Differential    Ambulatory referral/consult to Neurology    Ambulatory referral/consult to Home Health    blood-glucose meter kit    nystatin (MYCOSTATIN) powder          Follow up in about 3 months (around 4/8/2024).    Kaylin Gilman MD MPH   [employer]

## 2024-01-08 NOTE — ASSESSMENT & PLAN NOTE
- monitor BP at home  - continue taking daily prescribed anti hypertensive medications  - monitor symptoms like headaches, blurred vision, dizziness, Chest pain , or palpitations.  -Follow up if symptoms worsen or fail to resolve

## 2024-01-09 LAB
ALBUMIN SERPL BCP-MCNC: 3.7 G/DL (ref 3.5–5)
ALBUMIN/GLOB SERPL: 1.1 {RATIO}
ALP SERPL-CCNC: 30 U/L (ref 46–118)
ALT SERPL W P-5'-P-CCNC: 28 U/L (ref 13–56)
ANION GAP SERPL CALCULATED.3IONS-SCNC: 14 MMOL/L (ref 7–16)
AST SERPL W P-5'-P-CCNC: 16 U/L (ref 15–37)
BASOPHILS # BLD AUTO: 0.11 K/UL (ref 0–0.2)
BASOPHILS NFR BLD AUTO: 0.6 % (ref 0–1)
BILIRUB SERPL-MCNC: 0.4 MG/DL (ref ?–1.2)
BUN SERPL-MCNC: 18 MG/DL (ref 7–18)
BUN/CREAT SERPL: 17 (ref 6–20)
CALCIUM SERPL-MCNC: 9.9 MG/DL (ref 8.5–10.1)
CHLORIDE SERPL-SCNC: 96 MMOL/L (ref 98–107)
CHOLEST SERPL-MCNC: 201 MG/DL (ref 0–200)
CHOLEST/HDLC SERPL: 2.9 {RATIO}
CO2 SERPL-SCNC: 28 MMOL/L (ref 21–32)
CREAT SERPL-MCNC: 1.09 MG/DL (ref 0.55–1.02)
DIFFERENTIAL METHOD BLD: ABNORMAL
EGFR (NO RACE VARIABLE) (RUSH/TITUS): 59 ML/MIN/1.73M2
EOSINOPHIL # BLD AUTO: 0.01 K/UL (ref 0–0.5)
EOSINOPHIL NFR BLD AUTO: 0.1 % (ref 1–4)
ERYTHROCYTE [DISTWIDTH] IN BLOOD BY AUTOMATED COUNT: 19.5 % (ref 11.5–14.5)
EST. AVERAGE GLUCOSE BLD GHB EST-MCNC: 128 MG/DL
GLOBULIN SER-MCNC: 3.3 G/DL (ref 2–4)
GLUCOSE SERPL-MCNC: 143 MG/DL (ref 74–106)
HBA1C MFR BLD HPLC: 6.1 % (ref 4.5–6.6)
HCT VFR BLD AUTO: 42.6 % (ref 38–47)
HDLC SERPL-MCNC: 69 MG/DL (ref 40–60)
HGB BLD-MCNC: 13.1 G/DL (ref 12–16)
IMM GRANULOCYTES # BLD AUTO: 0.27 K/UL (ref 0–0.04)
IMM GRANULOCYTES NFR BLD: 1.4 % (ref 0–0.4)
LDLC SERPL CALC-MCNC: 101 MG/DL
LDLC/HDLC SERPL: 1.5 {RATIO}
LYMPHOCYTES # BLD AUTO: 1.31 K/UL (ref 1–4.8)
LYMPHOCYTES NFR BLD AUTO: 7 % (ref 27–41)
MCH RBC QN AUTO: 27.1 PG (ref 27–31)
MCHC RBC AUTO-ENTMCNC: 30.8 G/DL (ref 32–36)
MCV RBC AUTO: 88.2 FL (ref 80–96)
MONOCYTES # BLD AUTO: 0.36 K/UL (ref 0–0.8)
MONOCYTES NFR BLD AUTO: 1.9 % (ref 2–6)
MPC BLD CALC-MCNC: 9.6 FL (ref 9.4–12.4)
NEUTROPHILS # BLD AUTO: 16.73 K/UL (ref 1.8–7.7)
NEUTROPHILS NFR BLD AUTO: 89 % (ref 53–65)
NONHDLC SERPL-MCNC: 132 MG/DL
NRBC # BLD AUTO: 0 X10E3/UL
NRBC, AUTO (.00): 0 %
PLATELET # BLD AUTO: 651 K/UL (ref 150–400)
POTASSIUM SERPL-SCNC: 4.4 MMOL/L (ref 3.5–5.1)
PROT SERPL-MCNC: 7 G/DL (ref 6.4–8.2)
RBC # BLD AUTO: 4.83 M/UL (ref 4.2–5.4)
SODIUM SERPL-SCNC: 134 MMOL/L (ref 136–145)
TRIGL SERPL-MCNC: 153 MG/DL (ref 35–150)
TSH SERPL DL<=0.005 MIU/L-ACNC: 0.45 UIU/ML (ref 0.36–3.74)
VLDLC SERPL-MCNC: 31 MG/DL
WBC # BLD AUTO: 18.79 K/UL (ref 4.5–11)

## 2024-01-09 NOTE — ASSESSMENT & PLAN NOTE
- Nystain powder on the affected areas  - try keeping the affected area dry  -maintain skin hygiene  - Follow up if symptoms worsen or fail to resolve

## 2024-01-09 NOTE — ASSESSMENT & PLAN NOTE
- continue hyperlipidemia medications  - continue dietary restrictions.  - Lipid panel was ordered

## 2024-01-12 DIAGNOSIS — R39.9 URINARY TRACT INFECTION SYMPTOMS: Primary | ICD-10-CM

## 2024-01-12 RX ORDER — NITROFURANTOIN 25; 75 MG/1; MG/1
100 CAPSULE ORAL 2 TIMES DAILY
Qty: 14 CAPSULE | Refills: 0 | Status: SHIPPED | OUTPATIENT
Start: 2024-01-12 | End: 2024-01-19

## 2024-02-12 ENCOUNTER — DOCUMENTATION ONLY (OUTPATIENT)
Dept: FAMILY MEDICINE | Facility: CLINIC | Age: 59
End: 2024-02-12
Payer: COMMERCIAL

## 2024-02-12 NOTE — PROGRESS NOTES
Patient called to get update on the home health services. [facility] was contacted to provide home health services. Patient information was faxed to the company. Will update the patient if  the insurance approves the services.

## 2024-04-08 ENCOUNTER — TELEPHONE (OUTPATIENT)
Dept: FAMILY MEDICINE | Facility: CLINIC | Age: 59
End: 2024-04-08
Payer: COMMERCIAL

## 2024-04-08 PROBLEM — Z09 NEED FOR FOLLOW-UP BY HOME HEALTH SERVICE: Status: RESOLVED | Noted: 2024-01-08 | Resolved: 2024-04-08

## 2024-04-08 PROBLEM — I26.99 PULMONARY EMBOLISM, UNSPECIFIED CHRONICITY, UNSPECIFIED PULMONARY EMBOLISM TYPE, UNSPECIFIED WHETHER ACUTE COR PULMONALE PRESENT: Status: RESOLVED | Noted: 2024-01-08 | Resolved: 2024-04-08

## 2024-04-08 NOTE — TELEPHONE ENCOUNTER
----- Message from Leif Rowley sent at 4/4/2024  4:39 PM CDT -----  Regarding: Med refill  Eliquis 5mg    [facility]    Apparently requested at last visit, but wasn't sent?    Also had a referral for home health and neurology that were never completed.

## 2024-05-01 ENCOUNTER — HOSPITAL ENCOUNTER (EMERGENCY)
Facility: HOSPITAL | Age: 59
Discharge: HOME | End: 2024-05-01
Payer: COMMERCIAL

## 2024-05-01 VITALS
WEIGHT: 240 LBS | SYSTOLIC BLOOD PRESSURE: 120 MMHG | BODY MASS INDEX: 42.52 KG/M2 | DIASTOLIC BLOOD PRESSURE: 72 MMHG | HEART RATE: 88 BPM | TEMPERATURE: 98 F | RESPIRATION RATE: 16 BRPM | OXYGEN SATURATION: 96 % | HEIGHT: 63 IN

## 2024-05-01 DIAGNOSIS — E87.6 HYPOKALEMIA: ICD-10-CM

## 2024-05-01 DIAGNOSIS — K52.9 GASTROENTERITIS: Primary | ICD-10-CM

## 2024-05-01 DIAGNOSIS — R19.7 DIARRHEA, UNSPECIFIED TYPE: ICD-10-CM

## 2024-05-01 DIAGNOSIS — E83.42 HYPOMAGNESEMIA: ICD-10-CM

## 2024-05-01 LAB
ALBUMIN SERPL BCP-MCNC: 2.5 G/DL (ref 3.5–5)
ALBUMIN/GLOB SERPL: 0.6 {RATIO}
ALP SERPL-CCNC: 34 U/L (ref 46–118)
ALT SERPL W P-5'-P-CCNC: 17 U/L (ref 13–56)
ANION GAP SERPL CALCULATED.3IONS-SCNC: 17 MMOL/L (ref 7–16)
AST SERPL W P-5'-P-CCNC: 33 U/L (ref 15–37)
BASOPHILS # BLD AUTO: 0.06 K/UL (ref 0–0.2)
BASOPHILS NFR BLD AUTO: 0.8 % (ref 0–1)
BILIRUB SERPL-MCNC: 0.6 MG/DL (ref ?–1.2)
BILIRUB UR QL STRIP: 0.5
BUN SERPL-MCNC: 5 MG/DL (ref 7–18)
BUN/CREAT SERPL: 9 (ref 6–20)
CALCIUM SERPL-MCNC: 8.5 MG/DL (ref 8.5–10.1)
CHLORIDE SERPL-SCNC: 96 MMOL/L (ref 98–107)
CLARITY UR: CLEAR
CO2 SERPL-SCNC: 23 MMOL/L (ref 21–32)
COLOR UR: YELLOW
CREAT SERPL-MCNC: 0.58 MG/DL (ref 0.55–1.02)
DIFFERENTIAL METHOD BLD: ABNORMAL
EGFR (NO RACE VARIABLE) (RUSH/TITUS): 105 ML/MIN/1.73M2
EOSINOPHIL # BLD AUTO: 0.18 K/UL (ref 0–0.5)
EOSINOPHIL NFR BLD AUTO: 2.3 % (ref 1–4)
ERYTHROCYTE [DISTWIDTH] IN BLOOD BY AUTOMATED COUNT: 17.8 % (ref 11.5–14.5)
GLOBULIN SER-MCNC: 4 G/DL (ref 2–4)
GLUCOSE SERPL-MCNC: 92 MG/DL (ref 74–106)
GLUCOSE UR STRIP-MCNC: 30 MG/DL
HCT VFR BLD AUTO: 33.2 % (ref 38–47)
HGB BLD-MCNC: 10 G/DL (ref 12–16)
IMM GRANULOCYTES # BLD AUTO: 0.09 K/UL (ref 0–0.04)
IMM GRANULOCYTES NFR BLD: 1.2 % (ref 0–0.4)
KETONES UR STRIP-SCNC: 60 MG/DL
LEUKOCYTE ESTERASE UR QL STRIP: NEGATIVE
LYMPHOCYTES # BLD AUTO: 1.71 K/UL (ref 1–4.8)
LYMPHOCYTES NFR BLD AUTO: 22.1 % (ref 27–41)
MAGNESIUM SERPL-MCNC: 1.6 MG/DL (ref 1.7–2.3)
MCH RBC QN AUTO: 24.8 PG (ref 27–31)
MCHC RBC AUTO-ENTMCNC: 30.1 G/DL (ref 32–36)
MCV RBC AUTO: 82.2 FL (ref 80–96)
MONOCYTES # BLD AUTO: 0.69 K/UL (ref 0–0.8)
MONOCYTES NFR BLD AUTO: 8.9 % (ref 2–6)
MPC BLD CALC-MCNC: 8.5 FL (ref 9.4–12.4)
MUCOUS, UA: ABNORMAL /LPF
NEUTROPHILS # BLD AUTO: 5.01 K/UL (ref 1.8–7.7)
NEUTROPHILS NFR BLD AUTO: 64.7 % (ref 53–65)
NITRITE UR QL STRIP: NEGATIVE
NRBC # BLD AUTO: 0 X10E3/UL
NRBC, AUTO (.00): 0 %
PH UR STRIP: 6 PH UNITS
PLATELET # BLD AUTO: 601 K/UL (ref 150–400)
POTASSIUM SERPL-SCNC: 3 MMOL/L (ref 3.5–5.1)
PROT SERPL-MCNC: 6.5 G/DL (ref 6.4–8.2)
PROT UR QL STRIP: 20
RBC # BLD AUTO: 4.04 M/UL (ref 4.2–5.4)
RBC # UR STRIP: ABNORMAL /UL
RBC #/AREA URNS HPF: 4 /HPF
SODIUM SERPL-SCNC: 133 MMOL/L (ref 136–145)
SP GR UR STRIP: 1.01
SQUAMOUS #/AREA URNS LPF: ABNORMAL /HPF
UROBILINOGEN UR STRIP-ACNC: 2 MG/DL
WBC # BLD AUTO: 7.74 K/UL (ref 4.5–11)
WBC #/AREA URNS HPF: 4 /HPF

## 2024-05-01 PROCEDURE — 81003 URINALYSIS AUTO W/O SCOPE: CPT | Performed by: NURSE PRACTITIONER

## 2024-05-01 PROCEDURE — 25000003 PHARM REV CODE 250: Performed by: NURSE PRACTITIONER

## 2024-05-01 PROCEDURE — 96365 THER/PROPH/DIAG IV INF INIT: CPT

## 2024-05-01 PROCEDURE — 99284 EMERGENCY DEPT VISIT MOD MDM: CPT | Mod: ,,, | Performed by: NURSE PRACTITIONER

## 2024-05-01 PROCEDURE — 80053 COMPREHEN METABOLIC PANEL: CPT | Performed by: NURSE PRACTITIONER

## 2024-05-01 PROCEDURE — 99284 EMERGENCY DEPT VISIT MOD MDM: CPT | Mod: 25

## 2024-05-01 PROCEDURE — 83735 ASSAY OF MAGNESIUM: CPT | Performed by: NURSE PRACTITIONER

## 2024-05-01 PROCEDURE — 81001 URINALYSIS AUTO W/SCOPE: CPT | Performed by: NURSE PRACTITIONER

## 2024-05-01 PROCEDURE — 85025 COMPLETE CBC W/AUTO DIFF WBC: CPT | Performed by: NURSE PRACTITIONER

## 2024-05-01 PROCEDURE — 63600175 PHARM REV CODE 636 W HCPCS: Performed by: NURSE PRACTITIONER

## 2024-05-01 RX ORDER — ONDANSETRON 4 MG/1
4 TABLET, ORALLY DISINTEGRATING ORAL EVERY 8 HOURS PRN
Qty: 15 TABLET | Refills: 0 | Status: SHIPPED | OUTPATIENT
Start: 2024-05-01

## 2024-05-01 RX ORDER — POTASSIUM CHLORIDE 20 MEQ/1
40 TABLET, EXTENDED RELEASE ORAL
Status: COMPLETED | OUTPATIENT
Start: 2024-05-01 | End: 2024-05-01

## 2024-05-01 RX ORDER — MAGNESIUM SULFATE HEPTAHYDRATE 40 MG/ML
2 INJECTION, SOLUTION INTRAVENOUS
Status: COMPLETED | OUTPATIENT
Start: 2024-05-01 | End: 2024-05-01

## 2024-05-01 RX ORDER — SODIUM CHLORIDE 9 MG/ML
1000 INJECTION, SOLUTION INTRAVENOUS
Status: COMPLETED | OUTPATIENT
Start: 2024-05-01 | End: 2024-05-01

## 2024-05-01 RX ORDER — DIPHENOXYLATE HYDROCHLORIDE AND ATROPINE SULFATE 2.5; .025 MG/1; MG/1
1 TABLET ORAL
Status: COMPLETED | OUTPATIENT
Start: 2024-05-01 | End: 2024-05-01

## 2024-05-01 RX ADMIN — DIPHENOXYLATE HYDROCHLORIDE AND ATROPINE SULFATE 1 TABLET: 2.5; .025 TABLET ORAL at 12:05

## 2024-05-01 RX ADMIN — MAGNESIUM SULFATE HEPTAHYDRATE 2 G: 40 INJECTION, SOLUTION INTRAVENOUS at 12:05

## 2024-05-01 RX ADMIN — SODIUM CHLORIDE 1000 ML: 9 INJECTION, SOLUTION INTRAVENOUS at 12:05

## 2024-05-01 RX ADMIN — POTASSIUM CHLORIDE 40 MEQ: 1500 TABLET, EXTENDED RELEASE ORAL at 12:05

## 2024-05-01 NOTE — DISCHARGE INSTRUCTIONS
Use prescriptions as directed. Ensure you are drinking plenty of fluids including water, gatorade or other electrolyte supplements. Follow a bland diet. Follow up with your primary care provider in a week for recheck and continued care and management. Return to the ED for worsening signs and symptoms or otherwise as needed.

## 2024-05-01 NOTE — ED PROVIDER NOTES
Encounter Date: 2024       History     Chief Complaint   Patient presents with    Diarrhea    Nausea     58 y/o WF presents to the emergency department via EMS with c/o nausea, vomiting and diarrhea for the past 2-3 days. She states she was in the hospital at [facility] last week and treated for Sepsis. She states she is unsure what caused the sepsis. She states she was not sent home on any antibiotics. She denies any fevers or chills. She reports generalized body aches and generally not feeling well. She states she took some Imodium for her diarrhea and it did not help. She states she has taken phenergan for her nausea, vomiting and she believes she may be immune to it. There are no particular exacerbating or remitting factors.       The history is provided by the patient.     Review of patient's allergies indicates:   Allergen Reactions    Duloxetine hcl Other (See Comments)     HA    Nsaids (non-steroidal anti-inflammatory drug)     Statins-hmg-coa reductase inhibitors Other (See Comments)     Muscle pain      Past Medical History:   Diagnosis Date    Anxiety     Common variable immunodeficiency     Diabetes mellitus     Hashimoto's disease     History of osteopenia     History of pulmonary embolism     Hypertension     Hypothyroidism     Kidney stone     Myalgia     Nontoxic multinodular goiter     Other long term (current) drug therapy     Rheumatoid arthritis     Sicca syndrome     Toxoplasmosis     Vitamin D deficiency      Past Surgical History:   Procedure Laterality Date     SECTION      ESOPHAGOGASTRODUODENOSCOPY      HIP SURGERY      HYSTERECTOMY      LAPAROSCOPIC GASTRIC BANDING      SHOULDER SURGERY Right     TONSILLECTOMY      TUBAL LIGATION       Family History   Problem Relation Name Age of Onset    Cancer Mother      Cancer Father      Prostate cancer Father      Heart disease Father       Social History     Tobacco Use    Smoking status: Every Day     Types: Vaping with nicotine     Smokeless tobacco: Never    Tobacco comments:     Former cigarette smoking 1/2- 1 PPD ~ 30 Yrs: quit ~ 2021 (1 yr ago): occasional Vaping now   Substance Use Topics    Alcohol use: Not Currently    Drug use: Never     Review of Systems   All other systems reviewed and are negative.      Physical Exam     Initial Vitals [05/01/24 1033]   BP Pulse Resp Temp SpO2   123/75 96 16 98.3 °F (36.8 °C) 96 %      MAP       --         Physical Exam    Constitutional: She appears well-developed and well-nourished. She is cooperative.  Non-toxic appearance.   BMI >30  Chronically ill appearing   HENT:   Mouth/Throat: Oropharynx is clear and moist. Mucous membranes are dry.   Cardiovascular:  Normal rate and regular rhythm.           Pulmonary/Chest: Effort normal and breath sounds normal.   Abdominal: Abdomen is soft. Bowel sounds are normal. There is no abdominal tenderness.     Neurological: She is alert and oriented to person, place, and time. GCS eye subscore is 4. GCS verbal subscore is 5. GCS motor subscore is 6.   Skin: Skin is warm, dry and intact. Capillary refill takes less than 2 seconds.   Poor turgor         Medical Screening Exam   See Full Note    ED Course   Procedures  Labs Reviewed   COMPREHENSIVE METABOLIC PANEL - Abnormal; Notable for the following components:       Result Value    Sodium 133 (*)     Potassium 3.0 (*)     Chloride 96 (*)     Anion Gap 17 (*)     BUN 5 (*)     Albumin 2.5 (*)     Alk Phos 34 (*)     All other components within normal limits   URINALYSIS, REFLEX TO URINE CULTURE - Abnormal; Notable for the following components:    Protein, UA 20 (*)     Glucose, UA 30 (*)     Ketones, UA 60 (*)     Urobilinogen, UA 2 (*)     Bilirubin, UA 0.5 (*)     Blood, UA Trace (*)     All other components within normal limits   MAGNESIUM - Abnormal; Notable for the following components:    Magnesium 1.6 (*)     All other components within normal limits   CBC WITH DIFFERENTIAL - Abnormal; Notable for the  following components:    RBC 4.04 (*)     Hemoglobin 10.0 (*)     Hematocrit 33.2 (*)     MCH 24.8 (*)     MCHC 30.1 (*)     RDW 17.8 (*)     Platelet Count 601 (*)     MPV 8.5 (*)     Lymphocytes % 22.1 (*)     Monocytes % 8.9 (*)     Immature Granulocytes % 1.2 (*)     Immature Granulocytes, Absolute 0.09 (*)     All other components within normal limits   URINALYSIS, MICROSCOPIC - Abnormal; Notable for the following components:    RBC, UA 4 (*)     Squamous Epithelial Cells, UA Occasional (*)     Mucous Occasional (*)     All other components within normal limits   CBC W/ AUTO DIFFERENTIAL    Narrative:     The following orders were created for panel order CBC auto differential.  Procedure                               Abnormality         Status                     ---------                               -----------         ------                     CBC with Differential[[id number]]       Abnormal            Final result                 Please view results for these tests on the individual orders.          Imaging Results              XR ABDOMEN, ACUTE 2 OR MORE VIEWS WITH CHEST (Final result)  Result time 05/01/24 11:48:23      Final result by Mark Smith II, MD (05/01/24 11:48:23)                   Impression:      No evidence of abnormality demonstrated      Electronically signed by: Mark Smith  Date:    05/01/2024  Time:    11:48               Narrative:    EXAMINATION:  XR ABDOMEN ACUTE 2 OR MORE VIEWS WITH CHEST    CLINICAL HISTORY:  Abdominal pain nausea, vomiting, abd pain, diarrhea; weakness, septic last week;    COMPARISON:  13 September 2018    TECHNIQUE:  XR ABDOMEN 2 VIEWS WITH CHEST    FINDINGS:  No free fluid or free air seen.  The bowel gas pattern appears within normal limits.  No abnormal calcifications are present. Chest shows no evidence of abnormality. No other abnormality is identified.                                       Medications   0.9%  NaCl infusion (0 mLs Intravenous  Stopped 5/1/24 1301)   magnesium sulfate 2g in water 50mL IVPB (premix) (0 g Intravenous Stopped 5/1/24 1301)   potassium chloride SA CR tablet 40 mEq (40 mEq Oral Given 5/1/24 1216)   diphenoxylate-atropine 2.5-0.025 mg per tablet 1 tablet (1 tablet Oral Given 5/1/24 1259)     Medical Decision Making  60 y/o WF presents to the emergency department via EMS with c/o nausea, vomiting and diarrhea for the past 2-3 days. She states she was in the hospital at [facility] last week and treated for Sepsis. She states she is unsure what caused the sepsis. She states she was not sent home on any antibiotics. She denies any fevers or chills. She reports generalized body aches and generally not feeling well. She states she took some Imodium for her diarrhea and it did not help. She states she has taken phenergan for her nausea, vomiting and she believes she may be immune to it. There are no particular exacerbating or remitting factors.       Problems Addressed:  Diarrhea, unspecified type:     Details: Afebrile, WBC count normal. 1x dose of Lomotil given. Counseled on supportive measures. Follow up instructions given. Unable to provide stool sample in the emergency department. Warning s/s discussed and return precautions given; the patient has v/u.    Gastroenteritis:     Details: WBC count normal. Pt afebrile, non toxic appearing. IVF given. Zofran 8mg given per EMS. Pt reported improvement in nausea. Supportive measures. Rx for Zofran. Counseled on use and supportive measures. Follow up instructions given. Warning s/s discussed and return precautions given; the patient has v/u.    Hypokalemia:     Details: Supplemented, counseled on supportive measures.   Hypomagnesemia:     Details: Supplemented, counseled on supportive measures.    Amount and/or Complexity of Data Reviewed  Labs: ordered.  Radiology: ordered.    Risk  OTC drugs.  Prescription drug management.                                      Clinical Impression:   Final  diagnoses:  [K52.9] Gastroenteritis (Primary)  [R19.7] Diarrhea, unspecified type  [E87.6] Hypokalemia  [E83.42] Hypomagnesemia        ED Disposition Condition    Discharge Stable          ED Prescriptions       Medication Sig Dispense Start Date End Date Auth. Provider    ondansetron (ZOFRAN-ODT) 4 MG TbDL Take 1 tablet (4 mg total) by mouth every 8 (eight) hours as needed (nausea, vomiting). 15 tablet 5/1/2024 -- Mayela Domingo FNP          Follow-up Information       Follow up With Specialties Details Why Contact Info    Primary Care Provider  In 1 week               Mayela Domingo FNP  05/02/24 0940

## 2024-06-05 ENCOUNTER — HOSPITAL ENCOUNTER (EMERGENCY)
Facility: HOSPITAL | Age: 59
Discharge: HOME | End: 2024-06-05
Payer: COMMERCIAL

## 2024-06-05 VITALS
HEIGHT: 63 IN | WEIGHT: 210 LBS | HEART RATE: 98 BPM | OXYGEN SATURATION: 97 % | SYSTOLIC BLOOD PRESSURE: 145 MMHG | TEMPERATURE: 99 F | BODY MASS INDEX: 37.21 KG/M2 | RESPIRATION RATE: 18 BRPM | DIASTOLIC BLOOD PRESSURE: 92 MMHG

## 2024-06-05 DIAGNOSIS — R11.2 NAUSEA AND VOMITING, UNSPECIFIED VOMITING TYPE: Primary | ICD-10-CM

## 2024-06-05 DIAGNOSIS — N39.0 URINARY TRACT INFECTION WITHOUT HEMATURIA, SITE UNSPECIFIED: ICD-10-CM

## 2024-06-05 LAB
ALBUMIN SERPL BCP-MCNC: 2.6 G/DL (ref 3.5–5)
ALBUMIN/GLOB SERPL: 0.6 {RATIO}
ALP SERPL-CCNC: 34 U/L (ref 46–118)
ALT SERPL W P-5'-P-CCNC: 20 U/L (ref 13–56)
ANION GAP SERPL CALCULATED.3IONS-SCNC: 12 MMOL/L (ref 7–16)
ANISOCYTOSIS BLD QL SMEAR: ABNORMAL
AST SERPL W P-5'-P-CCNC: 44 U/L (ref 15–37)
BACTERIA #/AREA URNS HPF: ABNORMAL /HPF
BASOPHILS # BLD AUTO: 0.04 K/UL (ref 0–0.2)
BASOPHILS NFR BLD AUTO: 0.3 % (ref 0–1)
BILIRUB SERPL-MCNC: 0.5 MG/DL (ref ?–1.2)
BILIRUB UR QL STRIP: NEGATIVE
BUN SERPL-MCNC: 5 MG/DL (ref 7–18)
BUN/CREAT SERPL: 4 (ref 6–20)
CALCIUM SERPL-MCNC: 9.1 MG/DL (ref 8.5–10.1)
CHLORIDE SERPL-SCNC: 96 MMOL/L (ref 98–107)
CLARITY UR: ABNORMAL
CO2 SERPL-SCNC: 31 MMOL/L (ref 21–32)
COLOR UR: YELLOW
CREAT SERPL-MCNC: 1.3 MG/DL (ref 0.55–1.02)
DIFFERENTIAL METHOD BLD: ABNORMAL
EGFR (NO RACE VARIABLE) (RUSH/TITUS): 48 ML/MIN/1.73M2
EOSINOPHIL # BLD AUTO: 0.48 K/UL (ref 0–0.5)
EOSINOPHIL NFR BLD AUTO: 3.5 % (ref 1–4)
EOSINOPHIL NFR BLD MANUAL: 3 % (ref 1–4)
ERYTHROCYTE [DISTWIDTH] IN BLOOD BY AUTOMATED COUNT: 19.4 % (ref 11.5–14.5)
GLOBULIN SER-MCNC: 4.4 G/DL (ref 2–4)
GLUCOSE SERPL-MCNC: 131 MG/DL (ref 74–106)
GLUCOSE UR STRIP-MCNC: NEGATIVE MG/DL
HCT VFR BLD AUTO: 33.7 % (ref 38–47)
HGB BLD-MCNC: 10.2 G/DL (ref 12–16)
HYPOCHROMIA BLD QL SMEAR: ABNORMAL
KETONES UR STRIP-SCNC: NEGATIVE MG/DL
LACTATE SERPL-SCNC: 2 MMOL/L (ref 0.4–2)
LEUKOCYTE ESTERASE UR QL STRIP: ABNORMAL
LIPASE SERPL-CCNC: 44 U/L (ref 16–77)
LYMPHOCYTES # BLD AUTO: 2.48 K/UL (ref 1–4.8)
LYMPHOCYTES NFR BLD AUTO: 18.3 % (ref 27–41)
LYMPHOCYTES NFR BLD MANUAL: 18 % (ref 27–41)
MCH RBC QN AUTO: 24.4 PG (ref 27–31)
MCHC RBC AUTO-ENTMCNC: 30.3 G/DL (ref 32–36)
MCV RBC AUTO: 80.6 FL (ref 80–96)
MONOCYTES # BLD AUTO: 1.11 K/UL (ref 0–0.8)
MONOCYTES NFR BLD AUTO: 8.2 % (ref 2–6)
MONOCYTES NFR BLD MANUAL: 6 % (ref 2–6)
MPC BLD CALC-MCNC: 8.5 FL (ref 9.4–12.4)
NEUTROPHILS # BLD AUTO: 9.45 K/UL (ref 1.8–7.7)
NEUTROPHILS NFR BLD AUTO: 69.7 % (ref 53–65)
NEUTS BAND NFR BLD MANUAL: 1 % (ref 1–5)
NEUTS SEG NFR BLD MANUAL: 72 % (ref 50–62)
NITRITE UR QL STRIP: NEGATIVE
NRBC BLD MANUAL-RTO: ABNORMAL %
PH UR STRIP: 8.5 PH UNITS
PLATELET # BLD AUTO: 909 K/UL (ref 150–400)
POIKILOCYTOSIS BLD QL SMEAR: ABNORMAL
POTASSIUM SERPL-SCNC: 3.2 MMOL/L (ref 3.5–5.1)
PROT SERPL-MCNC: 7 G/DL (ref 6.4–8.2)
PROT UR QL STRIP: 30
RBC # BLD AUTO: 4.18 M/UL (ref 4.2–5.4)
RBC # UR STRIP: ABNORMAL /UL
RBC #/AREA URNS HPF: ABNORMAL /HPF
SODIUM SERPL-SCNC: 136 MMOL/L (ref 136–145)
SP GR UR STRIP: 1.02
SQUAMOUS #/AREA URNS LPF: ABNORMAL /LPF
UROBILINOGEN UR STRIP-ACNC: 0.2 MG/DL
WBC # BLD AUTO: 13.56 K/UL (ref 4.5–11)
WBC #/AREA URNS HPF: ABNORMAL /HPF

## 2024-06-05 PROCEDURE — 83690 ASSAY OF LIPASE: CPT | Performed by: NURSE PRACTITIONER

## 2024-06-05 PROCEDURE — 96365 THER/PROPH/DIAG IV INF INIT: CPT

## 2024-06-05 PROCEDURE — 80053 COMPREHEN METABOLIC PANEL: CPT | Performed by: NURSE PRACTITIONER

## 2024-06-05 PROCEDURE — 63600175 PHARM REV CODE 636 W HCPCS: Performed by: NURSE PRACTITIONER

## 2024-06-05 PROCEDURE — 85025 COMPLETE CBC W/AUTO DIFF WBC: CPT | Performed by: NURSE PRACTITIONER

## 2024-06-05 PROCEDURE — 99284 EMERGENCY DEPT VISIT MOD MDM: CPT | Mod: ,,, | Performed by: NURSE PRACTITIONER

## 2024-06-05 PROCEDURE — 99284 EMERGENCY DEPT VISIT MOD MDM: CPT | Mod: 25

## 2024-06-05 PROCEDURE — 83605 ASSAY OF LACTIC ACID: CPT | Performed by: NURSE PRACTITIONER

## 2024-06-05 PROCEDURE — 96361 HYDRATE IV INFUSION ADD-ON: CPT

## 2024-06-05 PROCEDURE — 25000003 PHARM REV CODE 250: Performed by: NURSE PRACTITIONER

## 2024-06-05 PROCEDURE — 87086 URINE CULTURE/COLONY COUNT: CPT | Performed by: NURSE PRACTITIONER

## 2024-06-05 PROCEDURE — 96375 TX/PRO/DX INJ NEW DRUG ADDON: CPT

## 2024-06-05 PROCEDURE — 81001 URINALYSIS AUTO W/SCOPE: CPT | Performed by: NURSE PRACTITIONER

## 2024-06-05 RX ORDER — POTASSIUM CHLORIDE 20 MEQ/1
40 TABLET, EXTENDED RELEASE ORAL ONCE
Status: COMPLETED | OUTPATIENT
Start: 2024-06-05 | End: 2024-06-05

## 2024-06-05 RX ORDER — ONDANSETRON HYDROCHLORIDE 2 MG/ML
4 INJECTION, SOLUTION INTRAVENOUS
Status: COMPLETED | OUTPATIENT
Start: 2024-06-05 | End: 2024-06-05

## 2024-06-05 RX ORDER — FAMOTIDINE 10 MG/ML
20 INJECTION INTRAVENOUS
Status: COMPLETED | OUTPATIENT
Start: 2024-06-05 | End: 2024-06-05

## 2024-06-05 RX ORDER — PROMETHAZINE HYDROCHLORIDE 25 MG/1
25 SUPPOSITORY RECTAL EVERY 6 HOURS PRN
Qty: 10 SUPPOSITORY | Refills: 0 | Status: SHIPPED | OUTPATIENT
Start: 2024-06-05

## 2024-06-05 RX ORDER — CEPHALEXIN 500 MG/1
500 CAPSULE ORAL 4 TIMES DAILY
Qty: 20 CAPSULE | Refills: 0 | Status: SHIPPED | OUTPATIENT
Start: 2024-06-05 | End: 2024-06-10

## 2024-06-05 RX ADMIN — ONDANSETRON 4 MG: 2 INJECTION INTRAMUSCULAR; INTRAVENOUS at 01:06

## 2024-06-05 RX ADMIN — POTASSIUM CHLORIDE 40 MEQ: 1500 TABLET, EXTENDED RELEASE ORAL at 12:06

## 2024-06-05 RX ADMIN — ONDANSETRON 4 MG: 2 INJECTION INTRAMUSCULAR; INTRAVENOUS at 02:06

## 2024-06-05 RX ADMIN — FAMOTIDINE 20 MG: 10 INJECTION INTRAVENOUS at 01:06

## 2024-06-05 RX ADMIN — SODIUM CHLORIDE 1000 ML: 9 INJECTION, SOLUTION INTRAVENOUS at 12:06

## 2024-06-05 RX ADMIN — PROMETHAZINE HYDROCHLORIDE 25 MG: 25 INJECTION INTRAMUSCULAR; INTRAVENOUS at 12:06

## 2024-06-05 NOTE — ED TRIAGE NOTES
Presents from home with chronic continued nausea and vomiting for the past 2 weeks.  Has taken zofran and phenergan at home but has continued to vomit.  Unable to take regular meds because of vomiting.  Has a PICC line and is receiving IV antibiotics

## 2024-06-05 NOTE — DISCHARGE INSTRUCTIONS
-Use phenergan supp 25 mg every 6 hours as needed for nausea with vomiting if unable to keep down Zofran or Phenergan  -Clear liquids x 12 hours, then advance to soft bland diet as tolerated  -Drink 2 glasses of water to 1 glass of of low carb Gatorade to help stay hydrated.   -Call Dr. Casarez's office to see if patient can be seen sooner.    -Keflex 500 mg twice a day for UTI and complete.

## 2024-06-05 NOTE — ED PROVIDER NOTES
Encounter Date: 2024       History     Chief Complaint   Patient presents with    Vomiting    Nausea     58 y/o WF with PMHx of CVID, HTN, Hypothryoidism, Hx: PE (anticoagulated with Eliquis) and MD arrived to the ED via [facility] with complaint of nausea and vomiting that ha been ongoing since her cholecystectomy 3 weeks ago at [facility]. Has been taking Phenergan and Zofran for her symptoms, but has very little relief. Last dose of Zofran and 1/2 tab of phenergan was 30 minutes PTA, but has not helped. Patient has not notified her PCP or surgeon. Denies fever, abdominal pain or diarrhea. Has a PICC line that she is receiving IV antibiotics for an infection. Patient is unsure of what the infections is or what antibiotics that she is taking.   On home O2 at 2L/NC since have gallbladder removed.     The history is provided by the patient.     Review of patient's allergies indicates:   Allergen Reactions    Duloxetine hcl Other (See Comments)     HA    Nsaids (non-steroidal anti-inflammatory drug)     Statins-hmg-coa reductase inhibitors Other (See Comments)     Muscle pain      Past Medical History:   Diagnosis Date    Anxiety     Common variable immunodeficiency     Diabetes mellitus     Hashimoto's disease     History of osteopenia     History of pulmonary embolism     Hypertension     Hypothyroidism     Kidney stone     Myalgia     Nontoxic multinodular goiter     Other long term (current) drug therapy     Rheumatoid arthritis     Sicca syndrome     Toxoplasmosis     Vitamin D deficiency      Past Surgical History:   Procedure Laterality Date     SECTION      CHOLECYSTECTOMY      ESOPHAGOGASTRODUODENOSCOPY      HIP SURGERY      HYSTERECTOMY      LAPAROSCOPIC GASTRIC BANDING      SHOULDER SURGERY Right     TONSILLECTOMY      TUBAL LIGATION       Family History   Problem Relation Name Age of Onset    Cancer Mother      Cancer Father      Prostate cancer Father      Heart disease Father        Social History     Tobacco Use    Smoking status: Former     Types: Vaping with nicotine    Smokeless tobacco: Never    Tobacco comments:     Former cigarette smoking 1/2- 1 PPD ~ 30 Yrs: quit ~ 2021 (1 yr ago): occasional Vaping now   Substance Use Topics    Alcohol use: Not Currently    Drug use: Never     Review of Systems   Constitutional:  Positive for appetite change (decreased).   Gastrointestinal:  Positive for abdominal pain (epigastric), nausea and vomiting. Negative for constipation and diarrhea.   Genitourinary:  Positive for frequency. Negative for dysuria.   Musculoskeletal:  Positive for arthralgias (chronic left hip pain).   Neurological:  Positive for weakness.   All other systems reviewed and are negative.      Physical Exam     Initial Vitals [06/05/24 1154]   BP Pulse Resp Temp SpO2   (!) 155/99 (!) 123 20 99.1 °F (37.3 °C) 99 %      MAP       --         Physical Exam    Nursing note and vitals reviewed.  Constitutional: She appears well-developed and well-nourished. She is cooperative.  Non-toxic appearance. She does not have a sickly appearance. She appears ill. No distress.   HENT:   Head: Normocephalic and atraumatic.   Right Ear: External ear normal.   Left Ear: External ear normal.   Mouth/Throat: Mucous membranes are dry.   Eyes: Conjunctivae are normal. Pupils are equal, round, and reactive to light.   Cardiovascular:  Regular rhythm and normal heart sounds.   Tachycardia present.         No edema to BLE   Pulmonary/Chest: Effort normal and breath sounds normal.     Neurological: She is alert and oriented to person, place, and time.   Skin: Skin is warm and dry. Capillary refill takes less than 2 seconds.   Psychiatric: She has a normal mood and affect. Her speech is normal and behavior is normal.         Medical Screening Exam   See Full Note    ED Course   Procedures  Labs Reviewed   COMPREHENSIVE METABOLIC PANEL - Abnormal; Notable for the following components:       Result Value     Potassium 3.2 (*)     Chloride 96 (*)     Glucose 131 (*)     BUN 5 (*)     Creatinine 1.30 (*)     BUN/Creatinine Ratio 4 (*)     Albumin 2.6 (*)     Globulin 4.4 (*)     Alk Phos 34 (*)     AST 44 (*)     eGFR 48 (*)     All other components within normal limits   CBC WITH DIFFERENTIAL - Abnormal; Notable for the following components:    WBC 13.56 (*)     RBC 4.18 (*)     Hemoglobin 10.2 (*)     Hematocrit 33.7 (*)     MCH 24.4 (*)     MCHC 30.3 (*)     RDW 19.4 (*)     Platelet Count 909 (*)     MPV 8.5 (*)     Neutrophils % 69.7 (*)     Lymphocytes % 18.3 (*)     Neutrophils, Abs 9.45 (*)     Monocytes % 8.2 (*)     Monocytes, Absolute 1.11 (*)     All other components within normal limits   URINALYSIS - Abnormal; Notable for the following components:    pH, UA 8.5 (*)     Leukocytes, UA Trace (*)     Protein, UA 30 (*)     Blood, UA Small (*)     All other components within normal limits   MANUAL DIFFERENTIAL - Abnormal; Notable for the following components:    Segmented Neutrophils, Man % 72 (*)     Lymphocytes, Man % 18 (*)     All other components within normal limits   URINALYSIS, MICROSCOPIC - Abnormal; Notable for the following components:    WBC, UA 11-15 (*)     RBC, UA 3-5 (*)     Bacteria, UA Loaded (*)     All other components within normal limits    Narrative:     Culture in progress.   LACTIC ACID, PLASMA - Normal   LIPASE - Normal   CULTURE, URINE   CBC W/ AUTO DIFFERENTIAL    Narrative:     The following orders were created for panel order CBC auto differential.  Procedure                               Abnormality         Status                     ---------                               -----------         ------                     CBC with Differential[[id number]]       Abnormal            Final result               Manual Differential[[id number]]         Abnormal            Final result                 Please view results for these tests on the individual orders.          Imaging Results     None          Medications   promethazine (PHENERGAN) 25 mg in dextrose 5 % (D5W) 50 mL IVPB (0 mg Intravenous Stopped 6/5/24 1229)   sodium chloride 0.9% bolus 1,000 mL 1,000 mL ( Intravenous Stopped 6/5/24 1319)   potassium chloride SA CR tablet 40 mEq (40 mEq Oral Given 6/5/24 1243)   ondansetron injection 4 mg (4 mg Intravenous Given 6/5/24 1329)   famotidine (PF) injection 20 mg (20 mg Intravenous Given 6/5/24 1350)   ondansetron injection 4 mg (4 mg Intravenous Given 6/5/24 1413)     Medical Decision Making  58 y/o WF with PMHx of CVID, HTN, Hypothryoidism, Hx: PE (anticoagulated with Eliquis) and MD arrived to the ED via [facility] with complaint of nausea and vomiting that ha been ongoing since her cholecystectomy 3 weeks ago at [facility]. Has been taking Phenergan and Zofran for her symptoms, but has very little relief. Last dose of Zofran and 1/2 tab of phenergan was 30 minutes PTA, but has not helped. Patient has not notified her surgeon or PCP. Denies fever, abdominal pain or diarrhea. Has a PICC line that she is receiving IV antibiotics for an infection. Patient is unsure of what the infections is or what antibiotics that she is taking.   On home O2 at 2L/NC since have gallbladder removed.     12:40 Spoke with VIANEY Curry home health nurse regarding patient. Currently receiving Daptomycin 700 mg IV daily for severe sepsis due to Enterococcus VRE bacteremia secondary to acute cholecystitis and UTI that was found during hospitalization 3 weeks ago related to UTI and s/p cholecystectomy. Nurse to fax a copy of patient's hospital records for review. Has follow up with Dr. Carlos Eduardo Casarez on 06/10 at 1 pm     Problems Addressed:  Nausea and vomiting, unspecified vomiting type:     Details: -Use phenergan supp 25 mg every 6 hours as needed for nausea with vomiting if unable to keep down Zofran or Phenergan  -Clear liquids x 12 hours, then advance to soft bland diet as tolerated  -Drink 2 glasses of water  to 1 glass of of low carb Gatorade to help stay hydrated.   -Call Dr. Casarez's office to see if patient can be seen sooner.      Urinary tract infection without hematuria, site unspecified:     Details: -Keflex 500 mg twice a day for UTI and complete.    Amount and/or Complexity of Data Reviewed  External Data Reviewed: notes.  Labs: ordered.     Details: Labs Reviewed  COMPREHENSIVE METABOLIC PANEL - Abnormal; Notable for the following components:     Potassium                     3.2 (*)                Chloride                      96 (*)                 Glucose                       131 (*)                BUN                           5 (*)                  Creatinine                    1.30 (*)               BUN/Creatinine Ratio          4 (*)                  Albumin                       2.6 (*)                Globulin                      4.4 (*)                Alk Phos                      34 (*)                 AST                           44 (*)                 eGFR                          48 (*)              All other components within normal limits  CBC WITH DIFFERENTIAL - Abnormal; Notable for the following components:     WBC                           13.56 (*)               RBC                           4.18 (*)               Hemoglobin                    10.2 (*)               Hematocrit                    33.7 (*)               MCH                           24.4 (*)               MCHC                          30.3 (*)               RDW                           19.4 (*)               Platelet Count                909 (*)                MPV                           8.5 (*)                Neutrophils %                 69.7 (*)               Lymphocytes %                 18.3 (*)               Neutrophils, Abs              9.45 (*)               Monocytes %                   8.2 (*)                Monocytes, Absolute           1.11 (*)            All other components within normal limits  URINALYSIS  - Abnormal; Notable for the following components:     pH, UA                        8.5 (*)                Leukocytes, UA                Trace (*)               Protein, UA                   30 (*)                 Blood, UA                     Small (*)            All other components within normal limits  MANUAL DIFFERENTIAL - Abnormal; Notable for the following components:     Segmented Neutrophils, Man %   72 (*)                 Lymphocytes, Man %            18 (*)              All other components within normal limits  URINALYSIS, MICROSCOPIC - Abnormal; Notable for the following components:     WBC, UA                       11-15 (*)               RBC, UA                       3-5 (*)                Bacteria, UA                  Loaded (*)            All other components within normal limits         Narrative: Culture in progress.  LACTIC ACID, PLASMA - Normal  LIPASE - Normal  CULTURE, URINE  CBC W/ AUTO DIFFERENTIAL     Creatinine was 1.3 with BUN 10 on 06/03  WBC elevated at 13 which is most likely related to nausea with vomiting  Discussion of management or test interpretation with external provider(s): Discharge MDM  I discussed lab results with patient and her   Patient was managed in the ED with:  -NS bolus x 1 liter  -Phenergan 25 mg IV x 1  -Zofran 4 mg IV x 2  -Pepcid 20 mg IV x 1  -Potassium Chl 40 meq po x 1    The response to treatment was nausea resolved. No vomiting while in the ED. Had dry heaving only that resolved. RX sent into pharmacy for Keflex and Phenergan supp. Reviewed discharge instructions with patient and his wife. They agreed to treatment plan and verbalized understanding.     Patient was discharged in stable condition.  Detailed return precautions discussed.     Risk  Prescription drug management.                                      Clinical Impression:   Final diagnoses:  [R11.2] Nausea and vomiting, unspecified vomiting type (Primary)  [N39.0] Urinary tract infection  without hematuria, site unspecified               Priscilla Romano, [facility]  06/05/24 8623

## 2024-06-07 LAB — UA COMPLETE W REFLEX CULTURE PNL UR: ABNORMAL

## 2024-06-24 DIAGNOSIS — R60.0 PEDAL EDEMA: ICD-10-CM

## 2024-06-25 RX ORDER — FUROSEMIDE 20 MG/1
20 TABLET ORAL
Qty: 90 TABLET | Refills: 0 | Status: SHIPPED | OUTPATIENT
Start: 2024-06-25

## 2024-10-04 ENCOUNTER — HOSPITAL ENCOUNTER (EMERGENCY)
Facility: HOSPITAL | Age: 59
Discharge: HOME | End: 2024-10-05
Payer: COMMERCIAL

## 2024-10-04 VITALS
OXYGEN SATURATION: 99 % | HEIGHT: 62 IN | SYSTOLIC BLOOD PRESSURE: 135 MMHG | RESPIRATION RATE: 22 BRPM | WEIGHT: 221 LBS | DIASTOLIC BLOOD PRESSURE: 75 MMHG | BODY MASS INDEX: 40.67 KG/M2 | HEART RATE: 84 BPM | TEMPERATURE: 98 F

## 2024-10-04 DIAGNOSIS — N30.01 ACUTE CYSTITIS WITH HEMATURIA: Primary | ICD-10-CM

## 2024-10-04 LAB
ANION GAP SERPL CALCULATED.3IONS-SCNC: 12 MMOL/L (ref 7–16)
BASOPHILS # BLD AUTO: 0.05 K/UL (ref 0–0.2)
BASOPHILS NFR BLD AUTO: 0.3 % (ref 0–1)
BUN SERPL-MCNC: 29 MG/DL (ref 7–18)
BUN/CREAT SERPL: 25 (ref 6–20)
CALCIUM SERPL-MCNC: 10.1 MG/DL (ref 8.5–10.1)
CHLORIDE SERPL-SCNC: 100 MMOL/L (ref 98–107)
CO2 SERPL-SCNC: 35 MMOL/L (ref 21–32)
CREAT SERPL-MCNC: 1.15 MG/DL (ref 0.55–1.02)
DIFFERENTIAL METHOD BLD: ABNORMAL
EGFR (NO RACE VARIABLE) (RUSH/TITUS): 55 ML/MIN/1.73M2
EOSINOPHIL # BLD AUTO: 0.11 K/UL (ref 0–0.5)
EOSINOPHIL NFR BLD AUTO: 0.6 % (ref 1–4)
ERYTHROCYTE [DISTWIDTH] IN BLOOD BY AUTOMATED COUNT: 19.2 % (ref 11.5–14.5)
GLUCOSE SERPL-MCNC: 136 MG/DL (ref 74–106)
HCT VFR BLD AUTO: 39.8 % (ref 38–47)
HGB BLD-MCNC: 11.9 G/DL (ref 12–16)
LYMPHOCYTES # BLD AUTO: 1.44 K/UL (ref 1–4.8)
LYMPHOCYTES NFR BLD AUTO: 7.2 % (ref 27–41)
LYMPHOCYTES NFR BLD MANUAL: 7 % (ref 27–41)
MCH RBC QN AUTO: 24.4 PG (ref 27–31)
MCHC RBC AUTO-ENTMCNC: 29.9 G/DL (ref 32–36)
MCV RBC AUTO: 81.7 FL (ref 80–96)
MONOCYTES # BLD AUTO: 1.38 K/UL (ref 0–0.8)
MONOCYTES NFR BLD AUTO: 6.9 % (ref 2–6)
MONOCYTES NFR BLD MANUAL: 5 % (ref 2–6)
MPC BLD CALC-MCNC: 8.7 FL (ref 9.4–12.4)
NEUTROPHILS # BLD AUTO: 17.01 K/UL (ref 1.8–7.7)
NEUTROPHILS NFR BLD AUTO: 85 % (ref 53–65)
NEUTS SEG NFR BLD MANUAL: 88 % (ref 50–62)
NRBC BLD MANUAL-RTO: ABNORMAL %
PLATELET # BLD AUTO: 536 K/UL (ref 150–400)
POTASSIUM SERPL-SCNC: 3.8 MMOL/L (ref 3.5–5.1)
RBC # BLD AUTO: 4.87 M/UL (ref 4.2–5.4)
SODIUM SERPL-SCNC: 143 MMOL/L (ref 136–145)
WBC # BLD AUTO: 19.99 K/UL (ref 4.5–11)

## 2024-10-04 PROCEDURE — 85025 COMPLETE CBC W/AUTO DIFF WBC: CPT | Performed by: NURSE PRACTITIONER

## 2024-10-04 PROCEDURE — 36415 COLL VENOUS BLD VENIPUNCTURE: CPT | Performed by: NURSE PRACTITIONER

## 2024-10-04 PROCEDURE — 99284 EMERGENCY DEPT VISIT MOD MDM: CPT | Mod: 25

## 2024-10-04 PROCEDURE — 80048 BASIC METABOLIC PNL TOTAL CA: CPT | Performed by: NURSE PRACTITIONER

## 2024-10-04 PROCEDURE — 99284 EMERGENCY DEPT VISIT MOD MDM: CPT | Mod: ,,, | Performed by: NURSE PRACTITIONER

## 2024-10-05 ENCOUNTER — TELEPHONE (OUTPATIENT)
Dept: EMERGENCY MEDICINE | Facility: HOSPITAL | Age: 59
End: 2024-10-05
Payer: COMMERCIAL

## 2024-10-05 LAB
BACTERIA #/AREA URNS HPF: ABNORMAL /HPF
BILIRUB UR QL STRIP: NEGATIVE
CLARITY UR: CLEAR
COLOR UR: YELLOW
GLUCOSE UR STRIP-MCNC: NEGATIVE MG/DL
KETONES UR STRIP-SCNC: NEGATIVE MG/DL
LEUKOCYTE ESTERASE UR QL STRIP: NEGATIVE
NITRITE UR QL STRIP: NEGATIVE
PH UR STRIP: 6 PH UNITS
PROT UR QL STRIP: NEGATIVE
RBC # UR STRIP: ABNORMAL /UL
RBC #/AREA URNS HPF: ABNORMAL /HPF
SP GR UR STRIP: 1.02
SQUAMOUS #/AREA URNS LPF: ABNORMAL /LPF
UROBILINOGEN UR STRIP-ACNC: 0.2 MG/DL
WBC #/AREA URNS HPF: ABNORMAL /HPF

## 2024-10-05 PROCEDURE — 63600175 PHARM REV CODE 636 W HCPCS: Performed by: NURSE PRACTITIONER

## 2024-10-05 PROCEDURE — 25000003 PHARM REV CODE 250: Performed by: NURSE PRACTITIONER

## 2024-10-05 PROCEDURE — 81001 URINALYSIS AUTO W/SCOPE: CPT | Performed by: NURSE PRACTITIONER

## 2024-10-05 PROCEDURE — 96361 HYDRATE IV INFUSION ADD-ON: CPT

## 2024-10-05 PROCEDURE — 81003 URINALYSIS AUTO W/O SCOPE: CPT | Performed by: NURSE PRACTITIONER

## 2024-10-05 PROCEDURE — 96365 THER/PROPH/DIAG IV INF INIT: CPT

## 2024-10-05 RX ORDER — CEFUROXIME AXETIL 500 MG/1
500 TABLET ORAL EVERY 12 HOURS
Qty: 14 TABLET | Refills: 0 | Status: SHIPPED | OUTPATIENT
Start: 2024-10-05 | End: 2024-10-05

## 2024-10-05 RX ORDER — CEFUROXIME AXETIL 500 MG/1
500 TABLET ORAL EVERY 12 HOURS
Qty: 14 TABLET | Refills: 0 | Status: SHIPPED | OUTPATIENT
Start: 2024-10-05 | End: 2024-10-12

## 2024-10-05 RX ADMIN — CEFTRIAXONE SODIUM 2 G: 2 INJECTION, POWDER, FOR SOLUTION INTRAMUSCULAR; INTRAVENOUS at 12:10

## 2024-10-05 RX ADMIN — SODIUM CHLORIDE 1000 ML: 9 INJECTION, SOLUTION INTRAVENOUS at 12:10

## 2024-10-05 NOTE — ED TRIAGE NOTES
Rec'd 58 y/o F in w/c w/ c/o UTI. Endorsed chronic UTIs. Seen by [facility] nurse today. UA obtained. PCP rx'd Macrobid. However, Macrobid is a listed allergy. The last time she took Macrobid, pt experienced SOB that resolved after d/c med.   States Rocephin/ Cipro usually work well for her.

## 2024-10-05 NOTE — DISCHARGE INSTRUCTIONS
Use prescriptions as directed. Alternate Tylenol and Ibuprofen as needed for pain. Drink plenty of fluids. Follow up with your primary care provider within the next week for recheck and continued care and management. Return to the ED for worsening signs and symptoms or otherwise as needed.

## 2024-10-05 NOTE — ED PROVIDER NOTES
Encounter Date: 10/4/2024       History     Chief Complaint   Patient presents with    Urinary Tract Infection     60 y/o WF presents to the emergency department with c/o urinary frequency and feeling generally unwell. She states she is unsure when her symptoms actually started. She states she has hx of chronic UTIs. She states she was seen by HH nurse today and had a UA done. She states her PCP gave her a prescription for Macrobid but she can not take that because she is allergic. She denies fevers or chills. She states she has had some nausea but no vomiting. She reports having had normal BM. She knows of no particular exacerbating or remitting factors.     The history is provided by the patient.     Review of patient's allergies indicates:   Allergen Reactions    Nitrofurantoin Shortness Of Breath    Duloxetine hcl Other (See Comments)     HA    Hydroxychloroquine Other (See Comments)    Nsaids (non-steroidal anti-inflammatory drug)     Pregabalin     Statins-hmg-coa reductase inhibitors Other (See Comments)     Muscle pain      Past Medical History:   Diagnosis Date    Anxiety     Common variable immunodeficiency     Diabetes mellitus     Hashimoto's disease     History of osteopenia     History of pulmonary embolism     Hypertension     Hypothyroidism     Kidney stone     Myalgia     Nontoxic multinodular goiter     Other long term (current) drug therapy     Respiratory distress     Rheumatoid arthritis     Sicca syndrome     Toxoplasmosis     Vitamin D deficiency      Past Surgical History:   Procedure Laterality Date     SECTION      CHOLECYSTECTOMY      ESOPHAGOGASTRODUODENOSCOPY      HIP SURGERY      HYSTERECTOMY      LAPAROSCOPIC GASTRIC BANDING      SHOULDER SURGERY Right     TONSILLECTOMY      TUBAL LIGATION       Family History   Problem Relation Name Age of Onset    Cancer Mother      Cancer Father      Prostate cancer Father      Heart disease Father       Social History     Tobacco Use     Smoking status: Former     Types: Vaping with nicotine    Smokeless tobacco: Never    Tobacco comments:     Former cigarette smoking 1/2- 1 PPD ~ 30 Yrs: quit ~ 2021 (1 yr ago): occasional Vaping now   Substance Use Topics    Alcohol use: Not Currently    Drug use: Never     Review of Systems   All other systems reviewed and are negative.      Physical Exam     Initial Vitals [10/04/24 2329]   BP Pulse Resp Temp SpO2   135/75 84 (!) 22 98.3 °F (36.8 °C) 99 %      MAP       --         Physical Exam    Constitutional: She appears well-developed and well-nourished. She is cooperative.  Non-toxic appearance.   BMI >30   Cardiovascular:  Regular rhythm and normal heart sounds.           Pulmonary/Chest: Effort normal and breath sounds normal.   Nasal cannula in place   Abdominal: Abdomen is soft. Bowel sounds are normal. There is no abdominal tenderness.     Neurological: She is alert and oriented to person, place, and time. GCS eye subscore is 4. GCS verbal subscore is 5. GCS motor subscore is 6.   Skin: Skin is warm, dry and intact. Capillary refill takes less than 2 seconds.         Medical Screening Exam   See Full Note    ED Course   Procedures  Labs Reviewed   BASIC METABOLIC PANEL - Abnormal       Result Value    Sodium 143      Potassium 3.8      Chloride 100      CO2 35 (*)     Anion Gap 12      Glucose 136 (*)     BUN 29 (*)     Creatinine 1.15 (*)     BUN/Creatinine Ratio 25 (*)     Calcium 10.1      eGFR 55 (*)    URINALYSIS, REFLEX TO URINE CULTURE - Abnormal    Color, UA Yellow      Clarity, UA Clear      pH, UA 6.0      Leukocytes, UA Negative      Nitrites, UA Negative      Protein, UA Negative      Glucose, UA Negative      Ketones, UA Negative      Urobilinogen, UA 0.2      Bilirubin, UA Negative      Blood, UA Moderate (*)     Specific Gravity, UA 1.025     CBC WITH DIFFERENTIAL - Abnormal    WBC 19.99 (*)     RBC 4.87      Hemoglobin 11.9 (*)     Hematocrit 39.8      MCV 81.7      MCH 24.4 (*)      MCHC 29.9 (*)     RDW 19.2 (*)     Platelet Count 536 (*)     MPV 8.7 (*)     Neutrophils % 85.0 (*)     Lymphocytes % 7.2 (*)     Neutrophils, Abs 17.01 (*)     Lymphocytes, Absolute 1.44      Diff Type Manual      Monocytes % 6.9 (*)     Eosinophils % 0.6 (*)     Basophils % 0.3      Monocytes, Absolute 1.38 (*)     Eosinophils, Absolute 0.11      Basophils, Absolute 0.05     MANUAL DIFFERENTIAL - Abnormal    Segmented Neutrophils, Man % 88 (*)     Lymphocytes, Man % 7 (*)     Monocytes, Man % 5      nRBC, Manual       URINALYSIS, MICROSCOPIC - Abnormal    WBC, UA 5-10 (*)     RBC, UA 5-10 (*)     Bacteria, UA Few (*)     Squamous Epithelial Cells, UA Few (*)    CBC W/ AUTO DIFFERENTIAL    Narrative:     The following orders were created for panel order CBC auto differential.  Procedure                               Abnormality         Status                     ---------                               -----------         ------                     CBC with Differential[[id number]]       Abnormal            Final result               Manual Differential[[id number]]         Abnormal            Final result                 Please view results for these tests on the individual orders.          Imaging Results    None          Medications   sodium chloride 0.9% bolus 1,000 mL 1,000 mL (has no administration in time range)   cefTRIAXone (ROCEPHIN) 2 g in D5W 100 mL IVPB (MB+) (has no administration in time range)     Medical Decision Making  60 y/o WF presents to the emergency department with c/o urinary frequency and feeling generally unwell. She states she is unsure when her symptoms actually started. She states she has hx of chronic UTIs. She states she was seen by HH nurse today and had a UA done. She states her PCP gave her a prescription for Macrobid but she can not take that because she is allergic. She denies fevers or chills. She states she has had some nausea but no vomiting. She reports having had normal BM.  "She knows of no particular exacerbating or remitting factors.     Problems Addressed:  Acute cystitis with hematuria:     Details: Patient is afebrile, non toxic appearing. WBC count is 19K; however, pt reports recently taking long and "double" medrol dose pack, feel some of her leukocytosis is due to that. IVF and IV Rocephin given. Rx for Ceftin, counseled on use and supportive measures. Follow up instructions given. Warning s/s discussed and return precautions given; the patient has v/u.      Amount and/or Complexity of Data Reviewed  Labs: ordered.    Risk  OTC drugs.  Prescription drug management.                                      Clinical Impression:   Final diagnoses:  [N30.01] Acute cystitis with hematuria (Primary)        ED Disposition Condition    Discharge Stable          ED Prescriptions       Medication Sig Dispense Start Date End Date Auth. Provider    cefUROXime (CEFTIN) 500 MG tablet Take 1 tablet (500 mg total) by mouth every 12 (twelve) hours. for 7 days 14 tablet 10/5/2024 10/12/2024 Mayela Domingo FNP          Follow-up Information       Follow up With Specialties Details Why Contact Info    Primary Care Provider  In 1 week               Mayela Domingo FNP  10/05/24 0038    "

## 2024-10-05 NOTE — TELEPHONE ENCOUNTER
Received call from pharmacy at [facility].  Patient there to  medications.  RX sent to [facility] in [place].  Rx resent to [facility] in [place] by OMAR Castaneda.  Patient is currently at [facility] .

## 2025-02-14 ENCOUNTER — HOSPITAL ENCOUNTER (EMERGENCY)
Facility: HOSPITAL | Age: 60
Discharge: HOME | End: 2025-02-14
Attending: EMERGENCY MEDICINE
Payer: COMMERCIAL

## 2025-02-14 VITALS
SYSTOLIC BLOOD PRESSURE: 156 MMHG | DIASTOLIC BLOOD PRESSURE: 90 MMHG | RESPIRATION RATE: 18 BRPM | HEART RATE: 88 BPM | BODY MASS INDEX: 44.18 KG/M2 | TEMPERATURE: 99 F | HEIGHT: 62 IN | OXYGEN SATURATION: 96 % | WEIGHT: 240.06 LBS

## 2025-02-14 DIAGNOSIS — R53.1 GENERALIZED WEAKNESS: Primary | ICD-10-CM

## 2025-02-14 DIAGNOSIS — R06.00 DYSPNEA: ICD-10-CM

## 2025-02-14 LAB
ALBUMIN SERPL BCP-MCNC: 2.9 G/DL (ref 3.5–5)
ALBUMIN/GLOB SERPL: 0.8 {RATIO}
ALP SERPL-CCNC: 36 U/L (ref 40–150)
ALT SERPL W P-5'-P-CCNC: 24 U/L
ANION GAP SERPL CALCULATED.3IONS-SCNC: 16 MMOL/L (ref 7–16)
AST SERPL W P-5'-P-CCNC: 34 U/L (ref 5–34)
BASOPHILS # BLD AUTO: 0.06 K/UL (ref 0–0.2)
BASOPHILS NFR BLD AUTO: 0.4 % (ref 0–1)
BILIRUB SERPL-MCNC: 0.4 MG/DL
BILIRUB UR QL STRIP: NEGATIVE
BUN SERPL-MCNC: 15 MG/DL (ref 10–20)
BUN/CREAT SERPL: 20 (ref 6–20)
CALCIUM SERPL-MCNC: 9.5 MG/DL (ref 8.4–10.2)
CHLORIDE SERPL-SCNC: 101 MMOL/L (ref 98–107)
CLARITY UR: CLEAR
CO2 SERPL-SCNC: 27 MMOL/L (ref 22–29)
COLOR UR: ABNORMAL
CREAT SERPL-MCNC: 0.74 MG/DL (ref 0.55–1.02)
DIFFERENTIAL METHOD BLD: ABNORMAL
EGFR (NO RACE VARIABLE) (RUSH/TITUS): 94 ML/MIN/1.73M2
EOSINOPHIL # BLD AUTO: 0.23 K/UL (ref 0–0.5)
EOSINOPHIL NFR BLD AUTO: 1.7 % (ref 1–4)
ERYTHROCYTE [DISTWIDTH] IN BLOOD BY AUTOMATED COUNT: 16.9 % (ref 11.5–14.5)
GLOBULIN SER-MCNC: 3.7 G/DL (ref 2–4)
GLUCOSE SERPL-MCNC: 126 MG/DL (ref 74–100)
GLUCOSE UR STRIP-MCNC: NORMAL MG/DL
HCT VFR BLD AUTO: 38.6 % (ref 38–47)
HGB BLD-MCNC: 12 G/DL (ref 12–16)
IMM GRANULOCYTES # BLD AUTO: 0.11 K/UL (ref 0–0.04)
IMM GRANULOCYTES NFR BLD: 0.8 % (ref 0–0.4)
INFLUENZA A MOLECULAR (OHS): NEGATIVE
INFLUENZA B MOLECULAR (OHS): NEGATIVE
KETONES UR STRIP-SCNC: NEGATIVE MG/DL
LACTATE SERPL-SCNC: 2.2 MMOL/L (ref 0.5–2.2)
LEUKOCYTE ESTERASE UR QL STRIP: NEGATIVE
LYMPHOCYTES # BLD AUTO: 3.05 K/UL (ref 1–4.8)
LYMPHOCYTES NFR BLD AUTO: 22.1 % (ref 27–41)
MAGNESIUM SERPL-MCNC: 1.8 MG/DL (ref 1.6–2.6)
MCH RBC QN AUTO: 25.6 PG (ref 27–31)
MCHC RBC AUTO-ENTMCNC: 31.1 G/DL (ref 32–36)
MCV RBC AUTO: 82.5 FL (ref 80–96)
MONOCYTES # BLD AUTO: 0.93 K/UL (ref 0–0.8)
MONOCYTES NFR BLD AUTO: 6.7 % (ref 2–6)
MPC BLD CALC-MCNC: 9.3 FL (ref 9.4–12.4)
MUCOUS, UA: ABNORMAL /LPF
NEUTROPHILS # BLD AUTO: 9.41 K/UL (ref 1.8–7.7)
NEUTROPHILS NFR BLD AUTO: 68.3 % (ref 53–65)
NITRITE UR QL STRIP: NEGATIVE
NRBC # BLD AUTO: 0 X10E3/UL
NRBC, AUTO (.00): 0 %
NT-PROBNP SERPL-MCNC: 91 PG/ML (ref 1–125)
PH UR STRIP: 6 PH UNITS
PLATELET # BLD AUTO: 477 K/UL (ref 150–400)
POTASSIUM SERPL-SCNC: 3.6 MMOL/L (ref 3.5–5.1)
PROT SERPL-MCNC: 6.6 G/DL (ref 6.4–8.3)
PROT UR QL STRIP: NEGATIVE
RBC # BLD AUTO: 4.68 M/UL (ref 4.2–5.4)
RBC # UR STRIP: ABNORMAL /UL
RBC #/AREA URNS HPF: 105 /HPF
SARS-COV-2 RDRP RESP QL NAA+PROBE: NEGATIVE
SODIUM SERPL-SCNC: 140 MMOL/L (ref 136–145)
SP GR UR STRIP: >1.05
SQUAMOUS #/AREA URNS LPF: ABNORMAL /HPF
TROPONIN I SERPL HS-MCNC: 6.9 NG/L
UROBILINOGEN UR STRIP-ACNC: NORMAL MG/DL
WBC # BLD AUTO: 13.79 K/UL (ref 4.5–11)
WBC #/AREA URNS HPF: 3 /HPF

## 2025-02-14 PROCEDURE — 25500020 PHARM REV CODE 255: Performed by: EMERGENCY MEDICINE

## 2025-02-14 PROCEDURE — 80053 COMPREHEN METABOLIC PANEL: CPT | Performed by: EMERGENCY MEDICINE

## 2025-02-14 PROCEDURE — 83735 ASSAY OF MAGNESIUM: CPT | Performed by: EMERGENCY MEDICINE

## 2025-02-14 PROCEDURE — 87502 INFLUENZA DNA AMP PROBE: CPT | Performed by: EMERGENCY MEDICINE

## 2025-02-14 PROCEDURE — 84484 ASSAY OF TROPONIN QUANT: CPT | Performed by: EMERGENCY MEDICINE

## 2025-02-14 PROCEDURE — 81003 URINALYSIS AUTO W/O SCOPE: CPT | Performed by: EMERGENCY MEDICINE

## 2025-02-14 PROCEDURE — 25000003 PHARM REV CODE 250: Performed by: EMERGENCY MEDICINE

## 2025-02-14 PROCEDURE — 85025 COMPLETE CBC W/AUTO DIFF WBC: CPT | Performed by: EMERGENCY MEDICINE

## 2025-02-14 PROCEDURE — 99285 EMERGENCY DEPT VISIT HI MDM: CPT | Mod: 25

## 2025-02-14 PROCEDURE — 36415 COLL VENOUS BLD VENIPUNCTURE: CPT | Performed by: EMERGENCY MEDICINE

## 2025-02-14 PROCEDURE — 83880 ASSAY OF NATRIURETIC PEPTIDE: CPT | Performed by: EMERGENCY MEDICINE

## 2025-02-14 PROCEDURE — 83605 ASSAY OF LACTIC ACID: CPT | Performed by: EMERGENCY MEDICINE

## 2025-02-14 PROCEDURE — 87635 SARS-COV-2 COVID-19 AMP PRB: CPT | Performed by: EMERGENCY MEDICINE

## 2025-02-14 RX ORDER — IOPAMIDOL 755 MG/ML
68 INJECTION, SOLUTION INTRAVASCULAR
Status: COMPLETED | OUTPATIENT
Start: 2025-02-14 | End: 2025-02-14

## 2025-02-14 RX ORDER — ONDANSETRON 4 MG/1
4 TABLET, ORALLY DISINTEGRATING ORAL
Status: COMPLETED | OUTPATIENT
Start: 2025-02-14 | End: 2025-02-14

## 2025-02-14 RX ORDER — FLUTICASONE PROPIONATE 50 MCG
1 SPRAY, SUSPENSION (ML) NASAL 2 TIMES DAILY
Qty: 15 G | Refills: 0 | Status: SHIPPED | OUTPATIENT
Start: 2025-02-14

## 2025-02-14 RX ADMIN — ONDANSETRON 4 MG: 4 TABLET, ORALLY DISINTEGRATING ORAL at 02:02

## 2025-02-14 RX ADMIN — IOPAMIDOL 68 ML: 755 INJECTION, SOLUTION INTRAVENOUS at 12:02

## 2025-02-14 NOTE — ED PROVIDER NOTES
Encounter Date: 2025       History     Chief Complaint   Patient presents with    Shortness of Breath     60 Y/O FEMALE SAYS SHE FEELS ACHY AND SICK ALL OVER.  SHE IS ON HOME OXYGEN.  SHE SAYS SHE HAS HAD SINUS-TYPE SYMPTOMS FOR ABOUT A WEEK, BUT YESTERDAY GOT MUCH WORSE.  SHE NOTES NO PARTICULAR REMITTING OR EXACERBATING FACTORS.          Review of patient's allergies indicates:   Allergen Reactions    Nitrofurantoin Shortness Of Breath    Duloxetine hcl Other (See Comments)     HA    Hydroxychloroquine Other (See Comments)    Nsaids (non-steroidal anti-inflammatory drug)     Pregabalin     Statins-hmg-coa reductase inhibitors Other (See Comments)     Muscle pain      Past Medical History:   Diagnosis Date    Anxiety     Common variable immunodeficiency     Diabetes mellitus     Hashimoto's disease     History of osteopenia     History of pulmonary embolism     Hypertension     Hypothyroidism     Kidney stone     Myalgia     Nontoxic multinodular goiter     Other long term (current) drug therapy     Respiratory distress     Rheumatoid arthritis     Sicca syndrome     Toxoplasmosis     Vitamin D deficiency      Past Surgical History:   Procedure Laterality Date     SECTION      CHOLECYSTECTOMY      ESOPHAGOGASTRODUODENOSCOPY      HIP SURGERY      HYSTERECTOMY      LAPAROSCOPIC GASTRIC BANDING      SHOULDER SURGERY Right     TONSILLECTOMY      TUBAL LIGATION       Family History   Problem Relation Name Age of Onset    Cancer Mother      Cancer Father      Prostate cancer Father      Heart disease Father       Social History     Tobacco Use    Smoking status: Former     Types: Vaping with nicotine    Smokeless tobacco: Never    Tobacco comments:     Former cigarette smoking 1/2- 1 PPD ~ 30 Yrs: quit ~  (1 yr ago): occasional Vaping now   Substance Use Topics    Alcohol use: Not Currently    Drug use: Never     Review of Systems   All other systems reviewed and are negative.      Physical Exam      Initial Vitals [02/14/25 0943]   BP Pulse Resp Temp SpO2   (!) 156/90 88 18 99.1 °F (37.3 °C) 96 %      MAP       --         Physical Exam    Nursing note and vitals reviewed.  Constitutional: She appears well-developed and well-nourished.   HENT:   Head: Normocephalic and atraumatic.   POST-NASAL DRAINAGE.     Eyes: Conjunctivae and EOM are normal. Pupils are equal, round, and reactive to light.   Neck: Neck supple.   Normal range of motion.  Cardiovascular:  Normal rate, regular rhythm and normal heart sounds.           Pulmonary/Chest: Breath sounds normal.   Abdominal: Abdomen is soft. Bowel sounds are normal.   Musculoskeletal:         General: Edema present. Normal range of motion.      Cervical back: Normal range of motion and neck supple.     Neurological: She is alert and oriented to person, place, and time. She has normal strength.   Skin: Skin is warm and dry.         Medical Screening Exam   See Full Note    ED Course   Procedures  Labs Reviewed   COMPREHENSIVE METABOLIC PANEL - Abnormal       Result Value    Sodium 140      Potassium 3.6      Chloride 101      CO2 27      Anion Gap 16      Glucose 126 (*)     BUN 15      Creatinine 0.74      BUN/Creatinine Ratio 20      Calcium 9.5      Total Protein 6.6      Albumin 2.9 (*)     Globulin 3.7      A/G Ratio 0.8      Bilirubin, Total 0.4      Alk Phos 36 (*)     ALT 24      AST 34      eGFR 94     URINALYSIS, REFLEX TO URINE CULTURE - Abnormal    Color, UA Light Yellow      Clarity, UA Clear      pH, UA 6.0      Leukocytes, UA Negative      Nitrites, UA Negative      Protein, UA Negative      Glucose, UA Normal      Ketones, UA Negative      Urobilinogen, UA Normal      Bilirubin, UA Negative      Blood, UA Large (*)     Specific Gravity, UA >1.050 (*)    CBC WITH DIFFERENTIAL - Abnormal    WBC 13.79 (*)     RBC 4.68      Hemoglobin 12.0      Hematocrit 38.6      MCV 82.5      MCH 25.6 (*)     MCHC 31.1 (*)     RDW 16.9 (*)     Platelet Count 477 (*)      MPV 9.3 (*)     Neutrophils % 68.3 (*)     Lymphocytes % 22.1 (*)     Monocytes % 6.7 (*)     Eosinophils % 1.7      Basophils % 0.4      Immature Granulocytes % 0.8 (*)     nRBC, Auto 0.0      Neutrophils, Abs 9.41 (*)     Lymphocytes, Absolute 3.05      Monocytes, Absolute 0.93 (*)     Eosinophils, Absolute 0.23      Basophils, Absolute 0.06      Immature Granulocytes, Absolute 0.11 (*)     nRBC, Absolute 0.00      Diff Type Auto     URINALYSIS, MICROSCOPIC - Abnormal    WBC, UA 3      RBC,  (*)     Squamous Epithelial Cells, UA Occasional (*)     Mucous Occasional (*)    INFLUENZA A & B BY MOLECULAR - Normal    INFLUENZA A MOLECULAR Negative      INFLUENZA B MOLECULAR  Negative     SARS-COV-2 RNA AMPLIFICATION, QUAL - Normal    SARS COV-2 Molecular Negative      Narrative:     Negative SARS-CoV results should not be used as the sole basis for treatment or patient management decisions; negative results should be considered in the context of a patient's recent exposures, history and the presene of clinical signs and symptoms consistent with COVID-19.  Negative results should be treated as presumptive and confirmed by molecular assay, if necessary for patient management.   NT-PRO NATRIURETIC PEPTIDE - Normal    ProBNP 91      Narrative:     9.9   MAGNESIUM - Normal    Magnesium 1.8     TROPONIN I - Normal    Troponin I High Sensitivity 6.9     LACTIC ACID, PLASMA - Normal    Lactic Acid 2.2     CBC W/ AUTO DIFFERENTIAL    Narrative:     The following orders were created for panel order CBC auto differential.  Procedure                               Abnormality         Status                     ---------                               -----------         ------                     CBC with Differential[[id number]]       Abnormal            Final result                 Please view results for these tests on the individual orders.          Imaging Results              CT Sinuses without Contrast (Final result)   Result time 02/14/25 16:42:17      Final result by Javier Damon MD (02/14/25 16:42:17)                   Impression:      1. Minimal sinus disease as described.      Electronically signed by: Javier Damon MD  Date:    02/14/2025  Time:    16:42               Narrative:    EXAMINATION:  CT SINUSES WITHOUT CONTRAST    CLINICAL HISTORY:  Sinonasal obstruction;    TECHNIQUE:  Axial low-dose images, coronal and sagittal reformations were performed through the paranasal sinuses.  Contrast was not administered.    COMPARISON:  None.    FINDINGS:  There is a mucous retention cyst or polyp within the lateral aspect of the right maxillary sinus.  There is minimal mucous membrane thickening of the ethmoid sinuses.  The mastoid air cells are clear.  No acute displaced maxillofacial fracture.  The bilateral globes and orbital content are unremarkable.  The visualized portions of the brain parenchyma are grossly unremarkable noting senescent change.                                       CTA Chest Non-Coronary (PE Studies) (Final result)  Result time 02/14/25 12:42:12      Final result by Garth Saucedo MD (02/14/25 12:42:12)                   Impression:      Motion and artifact limited study.  No large or central pulmonary embolus.  No convincing right heart strain.    Small pulmonary emboli difficult to exclude on this limited quality study.  Consider correlation with D-dimer and lower extremity venous ultrasound as appropriate.    Elevated right hemidiaphragm with significant right lower lobe subsegmental atelectasis.      Electronically signed by: Garth Saucedo MD  Date:    02/14/2025  Time:    12:42               Narrative:    EXAMINATION:  CTA CHEST NON CORONARY (PE STUDIES)    CLINICAL HISTORY:  Pulmonary embolism (PE) suspected, unknown D-dimer;    TECHNIQUE:  Low dose axial images, sagittal and coronal reformations were obtained from the thoracic inlet to the lung bases following the IV administration  of 68 mL of Isovue 370.  Contrast timing was optimized to evaluate the pulmonary arteries.  MIP images were performed.    COMPARISON:  Chest radiograph, 02/14/2025.    FINDINGS:  Examination of the soft tissue and vascular structures at the base of the neck is negative for acute finding.    The thoracic aorta maintains normal caliber, contour, and course without significant atherosclerotic calcification.  There is no evidence of aneurysmal dilation or dissection.    The pulmonary arteries distribute normally.  Main pulmonary artery is mildly distended measuring up to 3.5 cm in diameter.  No large or central pulmonary embolus.  Evaluation for smaller pulmonary emboli limited by extensive streak/motion artifact and suboptimal bolus timing.  Small pulmonary emboli not excluded, particularly in the right lower lobe.    The trachea and proximal airways are patent.    Asymmetric volume loss in the right lung with elevated right hemidiaphragm.  Passive atelectasis in the right lung base.  Coarse perihilar interstitial lung markings but no focal consolidation.  No significant pleural fluid.  No pneumothorax.    The heart is not enlarged.  Coronary artery calcifications.  No pericardial effusion.  No abnormal bowing of the interventricular septum.    There is no axillary, mediastinal, or hilar lymph node enlargement.    The esophagus maintains a normal course and caliber.    Limited images of the upper abdomen obtained during the course of this dedicated thoracic CT is negative for acute findings.    The osseous structures are negative for acute finding or aggressive osseous lesion.  Degenerative changes in the spine.  Mild body wall edema.  Left shoulder arthroplasty.                                       X-Ray Chest AP Portable (Final result)  Result time 02/14/25 10:39:18      Final result by Garth Saucedo MD (02/14/25 10:39:18)                   Impression:      Elevated right hemidiaphragm.  No acute process or  "detrimental change when compared with 05/01/2024.      Electronically signed by: Garth Saucedo MD  Date:    02/14/2025  Time:    10:39               Narrative:    EXAMINATION:  XR CHEST AP PORTABLE    CLINICAL HISTORY:  Provided history is "  Dyspnea, unspecified".    TECHNIQUE:  One view of the chest.    COMPARISON:  02/06/2023 and 05/01/2024.    FINDINGS:  Elevated right hemidiaphragm.  Cardiac silhouette is magnified by portable technique but not felt to be significantly enlarged.  Coarse interstitial lung markings but no large focal area of consolidation.  No large pleural effusion.  No pneumothorax.  Operative changes in the left shoulder and operative changes of ACDF.                                       Medications   iopamidoL (ISOVUE-370) injection 68 mL (68 mLs Intravenous Given 2/14/25 1203)   ondansetron disintegrating tablet 4 mg (4 mg Oral Given 2/14/25 7608)     Medical Decision Making  GENERALLY WEAK...    DDX:  METABOLIC VS INFECTIOUS VS OTHER    OUTPATIENT FOLLOW UP...    Amount and/or Complexity of Data Reviewed  Labs: ordered. Decision-making details documented in ED Course.  Radiology: ordered. Decision-making details documented in ED Course.    Risk  Prescription drug management.                                      Clinical Impression:   Final diagnoses:  [R06.00] Dyspnea  [R53.1] Generalized weakness (Primary)        ED Disposition Condition    Discharge Stable          ED Prescriptions       Medication Sig Dispense Start Date End Date Auth. Provider    fluticasone propionate (FLONASE) 50 mcg/actuation nasal spray 1 spray (50 mcg total) by Each Nostril route 2 (two) times daily. 15 g 2/14/2025 -- Serge Li MD          Follow-up Information       Follow up With Specialties Details Why Contact Info    PRIMARY CARE PROVIDER   As needed              Serge Li MD  03/06/25 2100    "